# Patient Record
Sex: FEMALE | Race: WHITE | Employment: OTHER | URBAN - METROPOLITAN AREA
[De-identification: names, ages, dates, MRNs, and addresses within clinical notes are randomized per-mention and may not be internally consistent; named-entity substitution may affect disease eponyms.]

---

## 2017-07-21 PROCEDURE — 81001 URINALYSIS AUTO W/SCOPE: CPT | Performed by: INTERNAL MEDICINE

## 2017-11-01 ENCOUNTER — HOSPITAL ENCOUNTER (EMERGENCY)
Facility: HOSPITAL | Age: 77
Discharge: HOME | End: 2017-11-01
Attending: EMERGENCY MEDICINE
Payer: MEDICARE

## 2017-11-01 ENCOUNTER — APPOINTMENT (OUTPATIENT)
Dept: GENERAL RADIOLOGY | Facility: HOSPITAL | Age: 77
End: 2017-11-01
Payer: MEDICARE

## 2017-11-01 VITALS
RESPIRATION RATE: 16 BRPM | HEART RATE: 67 BPM | DIASTOLIC BLOOD PRESSURE: 58 MMHG | OXYGEN SATURATION: 99 % | SYSTOLIC BLOOD PRESSURE: 143 MMHG | WEIGHT: 230 LBS | TEMPERATURE: 98 F | BODY MASS INDEX: 39.27 KG/M2 | HEIGHT: 64 IN

## 2017-11-01 DIAGNOSIS — S83.91XA SPRAIN OF RIGHT KNEE, UNSPECIFIED LIGAMENT, INITIAL ENCOUNTER: Primary | ICD-10-CM

## 2017-11-01 PROCEDURE — 73562 X-RAY EXAM OF KNEE 3: CPT | Performed by: EMERGENCY MEDICINE

## 2017-11-01 PROCEDURE — 99284 EMERGENCY DEPT VISIT MOD MDM: CPT

## 2017-11-01 PROCEDURE — 99283 EMERGENCY DEPT VISIT LOW MDM: CPT

## 2017-11-01 NOTE — ED INITIAL ASSESSMENT (HPI)
Pt c/o right knee pain. Pt states she hyperextended her knee after slipping on black ice this past weekend. Pt was to see ortho tomorrow but is unable to bear weight. Pt states pain is worse today.

## 2017-11-01 NOTE — ED PROVIDER NOTES
Patient Seen in: [facility] Emergency Department    History   Patient presents with:  Lower Extremity Injury (musculoskeletal)    Stated Complaint: knee pain    HPI    26-year-old female presents to the emergency department with complaints of right Unspecified vitamin D deficiency 7/9/2010       Past Surgical History:  1995: BENIGN BIOPSY RIGHT  9/2003: COLONOSCOPY      Comment: normal  10/6/11 Lalo Check: COLONOSCOPY      Comment: adenoma, hemorrhoids.  repeat 2021.  11/17/16: Blanca Sharma 162.6 cm (5' 4\")   Wt 104.3 kg   SpO2 99%   BMI 39.48 kg/m²         Physical Exam   Constitutional: She is oriented to person, place, and time. She appears well-developed and well-nourished. HENT:   Head: Normocephalic and atraumatic.    Eyes: EOM are no [id number]  [phone]    Schedule an appointment as soon as possible for a visit in 1 day        Medications Prescribed:  Current Discharge Medication List

## 2017-11-02 PROBLEM — M17.11 PRIMARY OSTEOARTHRITIS OF RIGHT KNEE: Status: ACTIVE | Noted: 2017-11-02

## 2017-11-02 PROBLEM — S83.91XA SPRAIN OF RIGHT KNEE, UNSPECIFIED LIGAMENT, INITIAL ENCOUNTER: Status: ACTIVE | Noted: 2017-11-02

## 2018-03-07 PROBLEM — S83.241D: Status: ACTIVE | Noted: 2018-03-07

## 2018-04-25 PROBLEM — M71.22 BAKER'S CYST OF KNEE, LEFT: Status: ACTIVE | Noted: 2018-04-25

## 2018-04-25 PROBLEM — M17.12 OSTEOARTHRITIS OF LEFT KNEE, UNSPECIFIED OSTEOARTHRITIS TYPE: Status: ACTIVE | Noted: 2018-04-25

## 2018-05-04 ENCOUNTER — APPOINTMENT (OUTPATIENT)
Dept: LAB | Age: 78
End: 2018-05-04
Payer: MEDICARE

## 2018-05-04 DIAGNOSIS — M17.11 PRIMARY OSTEOARTHRITIS OF RIGHT KNEE: ICD-10-CM

## 2018-05-04 DIAGNOSIS — S83.241D ACUTE TEAR MEDIAL MENISCUS, RIGHT, SUBSEQUENT ENCOUNTER: ICD-10-CM

## 2018-05-04 PROCEDURE — 93005 ELECTROCARDIOGRAM TRACING: CPT

## 2018-05-04 PROCEDURE — 80048 BASIC METABOLIC PNL TOTAL CA: CPT

## 2018-05-04 PROCEDURE — 93010 ELECTROCARDIOGRAM REPORT: CPT | Performed by: INTERNAL MEDICINE

## 2018-05-04 PROCEDURE — 36415 COLL VENOUS BLD VENIPUNCTURE: CPT

## 2018-05-14 ENCOUNTER — ANESTHESIA EVENT (OUTPATIENT)
Dept: SURGERY | Facility: HOSPITAL | Age: 78
End: 2018-05-14

## 2018-05-14 NOTE — H&P
[facility]    PATIENT'S NAME: Carl Foster   ATTENDING PHYSICIAN: Luisa Covarrubias M.D.    PATIENT ACCOUNT#:   [de-identified]    LOCATION:  OR   [facility]  MEDICAL RECORD #:   [id number]       YOB: 1940  ADMISSION DATE:       05/15/2018    H chondroplasty as appropriate. Limitations, risks, complications, postoperative scenarios are stressed. Possibility of continued pain, stiffness, vascular compromise, DVT, infection, hemarthrosis, surgical failure is understood. She is well counseled.   Diane Hammer

## 2018-05-15 ENCOUNTER — HOSPITAL ENCOUNTER (OUTPATIENT)
Facility: HOSPITAL | Age: 78
Setting detail: HOSPITAL OUTPATIENT SURGERY
Discharge: HOME | End: 2018-05-15
Attending: ORTHOPAEDIC SURGERY | Admitting: ORTHOPAEDIC SURGERY
Payer: MEDICARE

## 2018-05-15 ENCOUNTER — SURGERY (OUTPATIENT)
Age: 78
End: 2018-05-15

## 2018-05-15 ENCOUNTER — ANESTHESIA (OUTPATIENT)
Dept: SURGERY | Facility: HOSPITAL | Age: 78
End: 2018-05-15

## 2018-05-15 VITALS
WEIGHT: 233.69 LBS | HEART RATE: 60 BPM | RESPIRATION RATE: 16 BRPM | OXYGEN SATURATION: 98 % | BODY MASS INDEX: 39.9 KG/M2 | SYSTOLIC BLOOD PRESSURE: 152 MMHG | DIASTOLIC BLOOD PRESSURE: 67 MMHG | TEMPERATURE: 99 F | HEIGHT: 64 IN

## 2018-05-15 DIAGNOSIS — M17.11 PRIMARY OSTEOARTHRITIS OF RIGHT KNEE: ICD-10-CM

## 2018-05-15 DIAGNOSIS — S83.241D ACUTE MEDIAL MENISCUS TEAR OF RIGHT KNEE, SUBSEQUENT ENCOUNTER: ICD-10-CM

## 2018-05-15 DIAGNOSIS — S83.241D ACUTE TEAR MEDIAL MENISCUS, RIGHT, SUBSEQUENT ENCOUNTER: Primary | ICD-10-CM

## 2018-05-15 PROCEDURE — 0SBC4ZZ EXCISION OF RIGHT KNEE JOINT, PERCUTANEOUS ENDOSCOPIC APPROACH: ICD-10-PCS | Performed by: ORTHOPAEDIC SURGERY

## 2018-05-15 RX ORDER — MEPERIDINE HYDROCHLORIDE 25 MG/ML
12.5 INJECTION INTRAMUSCULAR; INTRAVENOUS; SUBCUTANEOUS AS NEEDED
Status: DISCONTINUED | OUTPATIENT
Start: 2018-05-15 | End: 2018-05-15

## 2018-05-15 RX ORDER — HYDROMORPHONE HYDROCHLORIDE 1 MG/ML
0.4 INJECTION, SOLUTION INTRAMUSCULAR; INTRAVENOUS; SUBCUTANEOUS EVERY 5 MIN PRN
Status: DISCONTINUED | OUTPATIENT
Start: 2018-05-15 | End: 2018-05-15

## 2018-05-15 RX ORDER — ACETAMINOPHEN 500 MG
1000 TABLET ORAL ONCE
COMMUNITY
End: 2018-05-24

## 2018-05-15 RX ORDER — SODIUM CHLORIDE, SODIUM LACTATE, POTASSIUM CHLORIDE, CALCIUM CHLORIDE 600; 310; 30; 20 MG/100ML; MG/100ML; MG/100ML; MG/100ML
INJECTION, SOLUTION INTRAVENOUS CONTINUOUS
Status: DISCONTINUED | OUTPATIENT
Start: 2018-05-15 | End: 2018-05-15

## 2018-05-15 RX ORDER — NALOXONE HYDROCHLORIDE 0.4 MG/ML
80 INJECTION, SOLUTION INTRAMUSCULAR; INTRAVENOUS; SUBCUTANEOUS AS NEEDED
Status: DISCONTINUED | OUTPATIENT
Start: 2018-05-15 | End: 2018-05-15

## 2018-05-15 RX ORDER — BUPIVACAINE HYDROCHLORIDE AND EPINEPHRINE 5; 5 MG/ML; UG/ML
INJECTION, SOLUTION EPIDURAL; INTRACAUDAL; PERINEURAL AS NEEDED
Status: DISCONTINUED | OUTPATIENT
Start: 2018-05-15 | End: 2018-05-15 | Stop reason: HOSPADM

## 2018-05-15 RX ORDER — HYDROCODONE BITARTRATE AND ACETAMINOPHEN 5; 325 MG/1; MG/1
2 TABLET ORAL AS NEEDED
Status: COMPLETED | OUTPATIENT
Start: 2018-05-15 | End: 2018-05-15

## 2018-05-15 RX ORDER — MIDAZOLAM HYDROCHLORIDE 1 MG/ML
1 INJECTION INTRAMUSCULAR; INTRAVENOUS EVERY 5 MIN PRN
Status: DISCONTINUED | OUTPATIENT
Start: 2018-05-15 | End: 2018-05-15

## 2018-05-15 RX ORDER — ONDANSETRON 2 MG/ML
4 INJECTION INTRAMUSCULAR; INTRAVENOUS AS NEEDED
Status: DISCONTINUED | OUTPATIENT
Start: 2018-05-15 | End: 2018-05-15

## 2018-05-15 RX ORDER — HYDROCODONE BITARTRATE AND ACETAMINOPHEN 5; 325 MG/1; MG/1
1 TABLET ORAL AS NEEDED
Status: COMPLETED | OUTPATIENT
Start: 2018-05-15 | End: 2018-05-15

## 2018-05-15 RX ORDER — CLINDAMYCIN PHOSPHATE 900 MG/50ML
900 INJECTION INTRAVENOUS ONCE
Status: COMPLETED | OUTPATIENT
Start: 2018-05-15 | End: 2018-05-15

## 2018-05-15 RX ORDER — DIPHENHYDRAMINE HYDROCHLORIDE 50 MG/ML
12.5 INJECTION INTRAMUSCULAR; INTRAVENOUS AS NEEDED
Status: DISCONTINUED | OUTPATIENT
Start: 2018-05-15 | End: 2018-05-15

## 2018-05-15 RX ORDER — LABETALOL HYDROCHLORIDE 5 MG/ML
5 INJECTION, SOLUTION INTRAVENOUS EVERY 5 MIN PRN
Status: DISCONTINUED | OUTPATIENT
Start: 2018-05-15 | End: 2018-05-15

## 2018-05-15 NOTE — BRIEF OP NOTE
Pre-Operative Diagnosis: Primary osteoarthritis of right knee [M17.11]  Acute medial meniscus tear of right knee, subsequent encounter [Z43.000U]     Post-Operative Diagnosis: Primary osteoarthritis of right knee [M17.11]Acute medial meniscus tear of right

## 2018-05-15 NOTE — INTERVAL H&P NOTE
Pre-op Diagnosis: Primary osteoarthritis of right knee [M17.11]  Acute medial meniscus tear of right knee, subsequent encounter [Y03.922F]    The above referenced H&P was reviewed by Audleia De MD on 5/15/2018, the patient was examined and no signifi

## 2018-05-15 NOTE — ANESTHESIA PREPROCEDURE EVALUATION
PRE-OP EVALUATION    Patient Name: Josh Brizuela    Pre-op Diagnosis: Primary osteoarthritis of right knee [M17.11]  Acute medial meniscus tear of right knee, subsequent encounter [D93.580X]    Procedure(s):  ARTHROSCOPY WITH PARTIAL MEDIAL MENISCECTOMY SVT    (-) angina     (-) HINOJOSA         Endo/Other      (-) diabetes     (+) hypothyroidism                (+) arthritis       Pulmonary      (-) asthma  (-) COPD            (-) sleep apnea       Neuro/Psych          (-) CVA     (-) seizures spouse                Present on Admission:  **None**

## 2018-05-15 NOTE — ANESTHESIA POSTPROCEDURE EVALUATION
[address] [place] Patient Status:  Hospital Outpatient Surgery   Age/Gender 70year old female MRN [id number]   [facility] SURGERY Attending Evelina Katz MD   Hosp Day # 0 PCP Zan Salazar MD       Anesthesia Post-op

## 2018-05-16 PROBLEM — Z47.89 ORTHOPEDIC AFTERCARE: Status: ACTIVE | Noted: 2018-05-16

## 2018-05-16 NOTE — OPERATIVE REPORT
[facility]    PATIENT'S NAME: Josette Jones   ATTENDING PHYSICIAN: Damaris Corona M.D. OPERATING PHYSICIAN: Damaris Corona M.D.    PATIENT ACCOUNT#:   [de-identified]    LOCATION:  [address] [place] [address] 14 EDWP 10  MEDICAL RECORD #:   [id number] Antibiotics are given. The extremity is initialed by me in preop holding. The usual superomedial arthroscopic inflow portal is established. Inferolateral viewing portal is established. Suprapatellar pouch is seen, grade 3 change is noted.   Grade 2 vega

## 2018-05-23 ENCOUNTER — HOSPITAL ENCOUNTER (EMERGENCY)
Facility: HOSPITAL | Age: 78
Discharge: HOME | End: 2018-05-24
Attending: EMERGENCY MEDICINE
Payer: MEDICARE

## 2018-05-23 ENCOUNTER — APPOINTMENT (OUTPATIENT)
Dept: GENERAL RADIOLOGY | Facility: HOSPITAL | Age: 78
End: 2018-05-23
Attending: EMERGENCY MEDICINE
Payer: MEDICARE

## 2018-05-23 ENCOUNTER — APPOINTMENT (OUTPATIENT)
Dept: ULTRASOUND IMAGING | Facility: HOSPITAL | Age: 78
End: 2018-05-23
Attending: EMERGENCY MEDICINE
Payer: MEDICARE

## 2018-05-23 DIAGNOSIS — I48.92 ATRIAL FLUTTER, PAROXYSMAL (HCC): Primary | ICD-10-CM

## 2018-05-23 PROCEDURE — 99291 CRITICAL CARE FIRST HOUR: CPT

## 2018-05-23 PROCEDURE — 96365 THER/PROPH/DIAG IV INF INIT: CPT

## 2018-05-23 PROCEDURE — 71045 X-RAY EXAM CHEST 1 VIEW: CPT | Performed by: EMERGENCY MEDICINE

## 2018-05-23 PROCEDURE — 85610 PROTHROMBIN TIME: CPT | Performed by: EMERGENCY MEDICINE

## 2018-05-23 PROCEDURE — 84484 ASSAY OF TROPONIN QUANT: CPT | Performed by: EMERGENCY MEDICINE

## 2018-05-23 PROCEDURE — 93971 EXTREMITY STUDY: CPT | Performed by: EMERGENCY MEDICINE

## 2018-05-23 PROCEDURE — 83735 ASSAY OF MAGNESIUM: CPT | Performed by: EMERGENCY MEDICINE

## 2018-05-23 PROCEDURE — 85025 COMPLETE CBC W/AUTO DIFF WBC: CPT | Performed by: EMERGENCY MEDICINE

## 2018-05-23 PROCEDURE — 80053 COMPREHEN METABOLIC PANEL: CPT | Performed by: EMERGENCY MEDICINE

## 2018-05-23 PROCEDURE — 83880 ASSAY OF NATRIURETIC PEPTIDE: CPT | Performed by: EMERGENCY MEDICINE

## 2018-05-23 PROCEDURE — 96366 THER/PROPH/DIAG IV INF ADDON: CPT

## 2018-05-23 PROCEDURE — 93010 ELECTROCARDIOGRAM REPORT: CPT

## 2018-05-23 PROCEDURE — 93005 ELECTROCARDIOGRAM TRACING: CPT

## 2018-05-23 PROCEDURE — 99285 EMERGENCY DEPT VISIT HI MDM: CPT

## 2018-05-23 PROCEDURE — 84443 ASSAY THYROID STIM HORMONE: CPT | Performed by: EMERGENCY MEDICINE

## 2018-05-23 PROCEDURE — 85730 THROMBOPLASTIN TIME PARTIAL: CPT | Performed by: EMERGENCY MEDICINE

## 2018-05-23 RX ORDER — HEPARIN SODIUM AND DEXTROSE 10000; 5 [USP'U]/100ML; G/100ML
INJECTION INTRAVENOUS CONTINUOUS
Status: CANCELLED | OUTPATIENT
Start: 2018-05-24

## 2018-05-23 RX ORDER — HEPARIN SODIUM 5000 [USP'U]/ML
5000 INJECTION INTRAVENOUS; SUBCUTANEOUS ONCE
Status: DISCONTINUED | OUTPATIENT
Start: 2018-05-23 | End: 2018-05-23

## 2018-05-23 RX ORDER — HEPARIN SODIUM AND DEXTROSE 10000; 5 [USP'U]/100ML; G/100ML
1000 INJECTION INTRAVENOUS ONCE
Status: DISCONTINUED | OUTPATIENT
Start: 2018-05-23 | End: 2018-05-23

## 2018-05-24 ENCOUNTER — APPOINTMENT (OUTPATIENT)
Dept: CT IMAGING | Facility: HOSPITAL | Age: 78
End: 2018-05-24
Attending: EMERGENCY MEDICINE
Payer: MEDICARE

## 2018-05-24 VITALS
HEIGHT: 64 IN | SYSTOLIC BLOOD PRESSURE: 137 MMHG | HEART RATE: 66 BPM | TEMPERATURE: 96 F | DIASTOLIC BLOOD PRESSURE: 57 MMHG | RESPIRATION RATE: 15 BRPM | BODY MASS INDEX: 38.41 KG/M2 | OXYGEN SATURATION: 98 % | WEIGHT: 225 LBS

## 2018-05-24 PROCEDURE — 71275 CT ANGIOGRAPHY CHEST: CPT | Performed by: EMERGENCY MEDICINE

## 2018-05-24 PROCEDURE — 93005 ELECTROCARDIOGRAM TRACING: CPT

## 2018-05-24 RX ORDER — METOPROLOL TARTRATE 75 MG/1
75 TABLET, FILM COATED ORAL 2 TIMES DAILY
Qty: 60 TABLET | Refills: 0 | Status: SHIPPED | OUTPATIENT
Start: 2018-05-24 | End: 2018-05-24

## 2018-05-24 NOTE — ED PROVIDER NOTES
Patient Seen in: [facility] Emergency Department    History   Patient presents with:  Arrythmia/Palpitations (cardiovascular)    Stated Complaint: Racing heart rate    HPI    70-year-old female presents emergency room for evaluation of racing heart. 6/17/2009   • Unspecified vitamin D deficiency 7/9/2010   • Visual impairment     glasses       Past Surgical History:  1995: BENIGN BIOPSY RIGHT  9/2003: COLONOSCOPY      Comment: normal  10/6/11 Opal Ast: COLONOSCOPY      Comment: adenoma, hemorrhoids.  r auscultation bilaterally. No Rales, no rhonchi, no wheezing, no stridor. ABDOMEN: Soft, nondistended,non tender, bowel sounds are present, no rebound, no rigidity, no guarding. no pulsatile masses.  No CVA tenderness  EXTREMITIES: No tenderness or swelling noted on EKG Report. Rate: 72  Rhythm: Sinus Rhythm  Reading: Sinus rhythm with first-degree AV block.   No acute ST or T-wave abnormality          ED Course as of May 24 0155  ------------------------------------------------------------    Preliminary Rad patient's cardiovascular instability due to her atrial flutter with rapid ventricular response. This involved direct patient intervention, complex decision making, and/or extensive discussions with the patient, family, and clinical staff.       LATONIA Nova

## 2018-07-30 PROBLEM — Z47.89 ORTHOPEDIC AFTERCARE: Status: RESOLVED | Noted: 2018-05-16 | Resolved: 2018-07-30

## 2018-08-02 PROCEDURE — 81001 URINALYSIS AUTO W/SCOPE: CPT | Performed by: INTERNAL MEDICINE

## 2018-09-26 PROBLEM — M17.12 PRIMARY OSTEOARTHRITIS OF LEFT KNEE: Status: ACTIVE | Noted: 2018-09-26

## 2018-11-08 PROCEDURE — 82397 CHEMILUMINESCENT ASSAY: CPT | Performed by: INTERNAL MEDICINE

## 2018-11-08 PROCEDURE — 86141 C-REACTIVE PROTEIN HS: CPT | Performed by: INTERNAL MEDICINE

## 2019-02-19 ENCOUNTER — HOSPITAL ENCOUNTER (EMERGENCY)
Age: 79
Discharge: HOME | End: 2019-02-19
Attending: EMERGENCY MEDICINE
Payer: MEDICARE

## 2019-02-19 ENCOUNTER — APPOINTMENT (OUTPATIENT)
Dept: GENERAL RADIOLOGY | Age: 79
End: 2019-02-19
Attending: EMERGENCY MEDICINE
Payer: MEDICARE

## 2019-02-19 ENCOUNTER — APPOINTMENT (OUTPATIENT)
Dept: CT IMAGING | Age: 79
End: 2019-02-19
Attending: EMERGENCY MEDICINE
Payer: MEDICARE

## 2019-02-19 VITALS
BODY MASS INDEX: 38 KG/M2 | SYSTOLIC BLOOD PRESSURE: 150 MMHG | HEART RATE: 71 BPM | WEIGHT: 220.44 LBS | TEMPERATURE: 98 F | DIASTOLIC BLOOD PRESSURE: 65 MMHG | RESPIRATION RATE: 16 BRPM | OXYGEN SATURATION: 98 %

## 2019-02-19 DIAGNOSIS — S09.90XA CLOSED HEAD INJURY, INITIAL ENCOUNTER: ICD-10-CM
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
## 2019-02-19 DIAGNOSIS — S20.229A CONTUSION OF BACK, UNSPECIFIED LATERALITY, INITIAL ENCOUNTER: ICD-10-CM

## 2019-02-19 PROCEDURE — 70450 CT HEAD/BRAIN W/O DYE: CPT | Performed by: EMERGENCY MEDICINE

## 2019-02-19 PROCEDURE — 99284 EMERGENCY DEPT VISIT MOD MDM: CPT

## 2019-02-19 PROCEDURE — 72110 X-RAY EXAM L-2 SPINE 4/>VWS: CPT | Performed by: EMERGENCY MEDICINE

## 2019-02-19 RX ORDER — ACETAMINOPHEN 500 MG
1000 TABLET ORAL ONCE
Status: COMPLETED | OUTPATIENT
Start: 2019-02-19 | End: 2019-02-19

## 2019-02-20 NOTE — ED PROVIDER NOTES
Patient Seen in: [facility] Emergency Department In [place]    History   Patient presents with:  Head Neck Injury (neurologic, musculoskeletal)  Fall (musculoskeletal, neurologic)    Stated Complaint: fall on ice, hit head, no LOC.  headache and low back p MAIN OR   • REPAIR ROTATOR CUFF,ACUTE Right 6/2014   • SHOULDER ARTHROSCOPY ROTATOR CUFF REPAIR Right 6/10/2014    Performed by Octavia Gonzales MD at [place] [address]      removed in childhood around age 10   • UPPER GI ENDOSCOPY - REFERRAL Awake, conversive and moving all 4 extremities well. ED Course   Labs Reviewed - No data to display       The patient declined analgesics for pain.     Xr Lumbar Spine (min 4 Views) (cpt=72110)    Result Date: 2/19/2019  CONCLUSION:  Marked facet degener

## 2019-03-07 PROBLEM — M76.52 PATELLAR TENDONITIS OF LEFT KNEE: Status: ACTIVE | Noted: 2019-03-07

## 2019-04-17 PROBLEM — M65.321 ACQUIRED TRIGGER FINGER OF RIGHT INDEX FINGER: Status: ACTIVE | Noted: 2019-04-17

## 2019-08-01 PROCEDURE — 81001 URINALYSIS AUTO W/SCOPE: CPT | Performed by: INTERNAL MEDICINE

## 2019-09-26 PROBLEM — M17.11 PRIMARY OSTEOARTHRITIS OF RIGHT KNEE: Status: ACTIVE | Noted: 2018-03-07

## 2019-09-26 PROBLEM — M17.11 PRIMARY OSTEOARTHRITIS OF RIGHT KNEE: Status: RESOLVED | Noted: 2017-11-02 | Resolved: 2019-09-26

## 2019-09-29 ENCOUNTER — APPOINTMENT (OUTPATIENT)
Dept: CT IMAGING | Facility: HOSPITAL | Age: 79
End: 2019-09-29
Attending: EMERGENCY MEDICINE
Payer: MEDICARE

## 2019-09-29 ENCOUNTER — HOSPITAL ENCOUNTER (EMERGENCY)
Facility: HOSPITAL | Age: 79
Discharge: HOME | End: 2019-09-29
Attending: EMERGENCY MEDICINE
Payer: MEDICARE

## 2019-09-29 VITALS
DIASTOLIC BLOOD PRESSURE: 63 MMHG | HEART RATE: 58 BPM | OXYGEN SATURATION: 98 % | HEIGHT: 64 IN | SYSTOLIC BLOOD PRESSURE: 145 MMHG | TEMPERATURE: 98 F | RESPIRATION RATE: 14 BRPM | BODY MASS INDEX: 37.22 KG/M2 | WEIGHT: 218 LBS

## 2019-09-29 DIAGNOSIS — K29.00 ACUTE GASTRITIS WITHOUT HEMORRHAGE, UNSPECIFIED GASTRITIS TYPE: Primary | ICD-10-CM

## 2019-09-29 PROCEDURE — 74177 CT ABD & PELVIS W/CONTRAST: CPT | Performed by: EMERGENCY MEDICINE

## 2019-09-29 PROCEDURE — 93005 ELECTROCARDIOGRAM TRACING: CPT

## 2019-09-29 PROCEDURE — 84484 ASSAY OF TROPONIN QUANT: CPT | Performed by: EMERGENCY MEDICINE

## 2019-09-29 PROCEDURE — 96374 THER/PROPH/DIAG INJ IV PUSH: CPT

## 2019-09-29 PROCEDURE — 81001 URINALYSIS AUTO W/SCOPE: CPT | Performed by: EMERGENCY MEDICINE

## 2019-09-29 PROCEDURE — 99285 EMERGENCY DEPT VISIT HI MDM: CPT

## 2019-09-29 PROCEDURE — 80053 COMPREHEN METABOLIC PANEL: CPT | Performed by: EMERGENCY MEDICINE

## 2019-09-29 PROCEDURE — 85025 COMPLETE CBC W/AUTO DIFF WBC: CPT | Performed by: EMERGENCY MEDICINE

## 2019-09-29 PROCEDURE — 96361 HYDRATE IV INFUSION ADD-ON: CPT

## 2019-09-29 PROCEDURE — 93010 ELECTROCARDIOGRAM REPORT: CPT

## 2019-09-29 RX ORDER — MAGNESIUM HYDROXIDE/ALUMINUM HYDROXICE/SIMETHICONE 120; 1200; 1200 MG/30ML; MG/30ML; MG/30ML
30 SUSPENSION ORAL ONCE
Status: COMPLETED | OUTPATIENT
Start: 2019-09-29 | End: 2019-09-29

## 2019-09-29 RX ORDER — ONDANSETRON 2 MG/ML
4 INJECTION INTRAMUSCULAR; INTRAVENOUS ONCE
Status: COMPLETED | OUTPATIENT
Start: 2019-09-29 | End: 2019-09-29

## 2019-09-29 RX ORDER — LIDOCAINE HYDROCHLORIDE 20 MG/ML
10 SOLUTION OROPHARYNGEAL ONCE
Status: COMPLETED | OUTPATIENT
Start: 2019-09-29 | End: 2019-09-29

## 2019-09-29 RX ORDER — OMEPRAZOLE 40 MG/1
40 CAPSULE, DELAYED RELEASE ORAL DAILY
Qty: 30 CAPSULE | Refills: 0 | Status: SHIPPED | OUTPATIENT
Start: 2019-09-29 | End: 2019-10-24

## 2019-09-29 NOTE — ED INITIAL ASSESSMENT (HPI)
Pt aox4. Pt presents to ed from Immediate care. Pt c/o epigastric fullness/pressure sine Friday. Pt states s/s are worse when lying down.

## 2019-09-29 NOTE — ED PROVIDER NOTES
Patient Seen in: [facility] Emergency Department      History   Patient presents with:  Abdomen/Flank Pain (GI/)    Stated Complaint: abd pain    HPI    71-year-old female presents emergency department who was sent from immediate care.   Patient ap normal   • HERNIA SURGERY  1985    inguinal   • HYSTERECTOMY  1985    with BSO   • KNEE ARTHROSCOPY Right 5/15/2018    Performed by Merlinda Lake, MD at [facility] Right 6/2014   • SHOULDER ARTHROSCOPY ROTATOR CUFF REPAI No clubbing cyanosis or edema 2+ distal pulses. Neuro: Cranial nerves II through XII intact with no gross focal sensory or motor abnormality.       ED Course     Labs Reviewed   COMP METABOLIC PANEL (14) - Abnormal; Notable for the following components: recommended. 2. There are mildly enlarged lymph nodes inferior to the gastric wall thickening which may be reactive or represent metastatic lymph nodes. There is also possible wall thickening of the gastric fundus.   Clinical correlation with direct inspec

## 2019-10-03 PROCEDURE — 88305 TISSUE EXAM BY PATHOLOGIST: CPT | Performed by: INTERNAL MEDICINE

## 2019-11-26 PROCEDURE — 88305 TISSUE EXAM BY PATHOLOGIST: CPT | Performed by: INTERNAL MEDICINE

## 2020-02-10 ENCOUNTER — HOSPITAL ENCOUNTER (OUTPATIENT)
Dept: PHYSICAL THERAPY | Facility: HOSPITAL | Age: 80
Discharge: HOME | End: 2020-02-10
Attending: ORTHOPAEDIC SURGERY
Payer: MEDICARE

## 2020-02-10 ENCOUNTER — LABORATORY ENCOUNTER (OUTPATIENT)
Dept: LAB | Facility: HOSPITAL | Age: 80
End: 2020-02-10
Attending: ORTHOPAEDIC SURGERY
Payer: MEDICARE

## 2020-02-10 DIAGNOSIS — M17.12 OSTEOARTHRITIS OF LEFT KNEE, UNSPECIFIED OSTEOARTHRITIS TYPE: ICD-10-CM

## 2020-02-10 LAB
ANTIBODY SCREEN: NEGATIVE
RH BLOOD TYPE: POSITIVE

## 2020-02-10 PROCEDURE — 86901 BLOOD TYPING SEROLOGIC RH(D): CPT

## 2020-02-10 PROCEDURE — 86850 RBC ANTIBODY SCREEN: CPT

## 2020-02-10 PROCEDURE — 86900 BLOOD TYPING SEROLOGIC ABO: CPT

## 2020-02-10 PROCEDURE — 87081 CULTURE SCREEN ONLY: CPT

## 2020-02-11 ENCOUNTER — ANESTHESIA EVENT (OUTPATIENT)
Dept: SURGERY | Facility: HOSPITAL | Age: 80
End: 2020-02-11
Payer: MEDICARE

## 2020-02-24 NOTE — ANESTHESIA PREPROCEDURE EVALUATION
PRE-OP EVALUATION    Patient Name: Ivan Pace    Pre-op Diagnosis: Primary osteoarthritis of left knee [M17.12]    Procedure(s):  LEFT TOTAL KNEE ARTHROPLASTY    Surgeon(s) and Role:     * Guerda Velazquez MD - Primary    Pre-op vitals reviewed. ROS.                       Neuro/Psych    Negative neuro/psych ROS.                                 Past Surgical History:   Procedure Laterality Date   • ARTHROSCOPY OF JOINT UNLISTED Right     knee   • BENIGN BIOPSY RIGHT  1995   • COLONOSCOPY  9/2003 02/06/2020    CREATSERUM 1.03 (H) 02/06/2020    GLU 90 02/06/2020     Lab Results   Component Value Date    INR 1.0 02/06/2020         Airway      Mallampati: III  Mouth opening: >3 FB  TM distance: 4 - 6 cm  Neck ROM: full Cardiovascular    Cardiovascular

## 2020-02-24 NOTE — H&P
[facility]  Orthopedic Surgery  HISTORY AND PHYSICAL EXAMINATION    Patient: Peter Burnett  Medical Record Number: [id number]    CHIEF COMPLAINT: Left knee pain  HPI:   Rogerio Kendall is a 68year old female  Followed in the office.   Failed exhaustive • Esophageal reflux 2/26/2008   • Hearing impairment     Roman HA's   • High blood pressure    • Hypothyroidism    • Mixed hyperlipidemia 2/26/2008   • OBESITY    • Osteoarthrosis, unspecified whether generalized or localized, unspecified site    • TINNIT bilaterally, no rales or wheezes  ABDOMINAL: Soft, no palpable masses. NEURO:  Alert and orientated X3, cranial nerves II-XII intact. EXTREMITIES: Well-developed well-nourished 45-year-old female, abnormal gait and station. Ambulates with a limp.   Right

## 2020-02-25 ENCOUNTER — APPOINTMENT (OUTPATIENT)
Dept: GENERAL RADIOLOGY | Facility: HOSPITAL | Age: 80
End: 2020-02-25
Attending: ORTHOPAEDIC SURGERY
Payer: MEDICARE

## 2020-02-25 ENCOUNTER — HOSPITAL ENCOUNTER (OUTPATIENT)
Facility: HOSPITAL | Age: 80
Discharge: HOME HEALTH | End: 2020-02-26
Attending: ORTHOPAEDIC SURGERY | Admitting: ORTHOPAEDIC SURGERY
Payer: MEDICARE

## 2020-02-25 ENCOUNTER — ANESTHESIA (OUTPATIENT)
Dept: SURGERY | Facility: HOSPITAL | Age: 80
End: 2020-02-25
Payer: MEDICARE

## 2020-02-25 DIAGNOSIS — M17.12 PRIMARY OSTEOARTHRITIS OF LEFT KNEE: ICD-10-CM

## 2020-02-25 DIAGNOSIS — M17.12 OSTEOARTHRITIS OF LEFT KNEE, UNSPECIFIED OSTEOARTHRITIS TYPE: Primary | ICD-10-CM

## 2020-02-25 PROBLEM — M71.22 BAKER'S CYST OF KNEE, LEFT: Status: RESOLVED | Noted: 2018-04-25 | Resolved: 2020-02-25

## 2020-02-25 PROBLEM — M76.52 PATELLAR TENDONITIS OF LEFT KNEE: Status: RESOLVED | Noted: 2019-03-07 | Resolved: 2020-02-25

## 2020-02-25 LAB
ANION GAP SERPL CALC-SCNC: 8 MMOL/L (ref 0–18)
BUN BLD-MCNC: 23 MG/DL (ref 7–18)
BUN/CREAT SERPL: 20.7 (ref 10–20)
CALCIUM BLD-MCNC: 9.3 MG/DL (ref 8.5–10.1)
CHLORIDE SERPL-SCNC: 108 MMOL/L (ref 98–112)
CO2 SERPL-SCNC: 21 MMOL/L (ref 21–32)
CREAT BLD-MCNC: 1.07 MG/DL (ref 0.55–1.02)
CREAT BLD-MCNC: 1.11 MG/DL (ref 0.55–1.02)
GLUCOSE BLD-MCNC: 106 MG/DL (ref 70–99)
OSMOLALITY SERPL CALC.SUM OF ELEC: 288 MOSM/KG (ref 275–295)
POTASSIUM SERPL-SCNC: 4 MMOL/L (ref 3.5–5.1)
SODIUM SERPL-SCNC: 137 MMOL/L (ref 136–145)

## 2020-02-25 PROCEDURE — 88311 DECALCIFY TISSUE: CPT | Performed by: ORTHOPAEDIC SURGERY

## 2020-02-25 PROCEDURE — 76942 ECHO GUIDE FOR BIOPSY: CPT

## 2020-02-25 PROCEDURE — 73560 X-RAY EXAM OF KNEE 1 OR 2: CPT | Performed by: ORTHOPAEDIC SURGERY

## 2020-02-25 PROCEDURE — 97530 THERAPEUTIC ACTIVITIES: CPT

## 2020-02-25 PROCEDURE — 88305 TISSUE EXAM BY PATHOLOGIST: CPT | Performed by: ORTHOPAEDIC SURGERY

## 2020-02-25 PROCEDURE — 97161 PT EVAL LOW COMPLEX 20 MIN: CPT

## 2020-02-25 PROCEDURE — 0SRD0J9 REPLACEMENT OF LEFT KNEE JOINT WITH SYNTHETIC SUBSTITUTE, CEMENTED, OPEN APPROACH: ICD-10-PCS | Performed by: ORTHOPAEDIC SURGERY

## 2020-02-25 PROCEDURE — 96375 TX/PRO/DX INJ NEW DRUG ADDON: CPT

## 2020-02-25 PROCEDURE — 3E0T3BZ INTRODUCTION OF ANESTHETIC AGENT INTO PERIPHERAL NERVES AND PLEXI, PERCUTANEOUS APPROACH: ICD-10-PCS | Performed by: ANESTHESIOLOGY

## 2020-02-25 PROCEDURE — 80048 BASIC METABOLIC PNL TOTAL CA: CPT | Performed by: HOSPITALIST

## 2020-02-25 PROCEDURE — 82565 ASSAY OF CREATININE: CPT | Performed by: ORTHOPAEDIC SURGERY

## 2020-02-25 DEVICE — REFOBACIN BC R 1X40 US: Type: IMPLANTABLE DEVICE | Site: KNEE | Status: FUNCTIONAL

## 2020-02-25 DEVICE — PSN FEM CR CMT CCR NRW SZ9 L: Type: IMPLANTABLE DEVICE | Site: KNEE | Status: FUNCTIONAL

## 2020-02-25 DEVICE — PSN ALL POLY PAT PLY 35MM: Type: IMPLANTABLE DEVICE | Site: KNEE | Status: FUNCTIONAL

## 2020-02-25 DEVICE — PSN TIB STM 5 DEG SZ E L: Type: IMPLANTABLE DEVICE | Site: KNEE | Status: FUNCTIONAL

## 2020-02-25 DEVICE — PSN STR HYB ST 14X+30 M: Type: IMPLANTABLE DEVICE | Site: KNEE | Status: FUNCTIONAL

## 2020-02-25 RX ORDER — CEFAZOLIN SODIUM/WATER 2 G/20 ML
2 SYRINGE (ML) INTRAVENOUS EVERY 8 HOURS
Status: COMPLETED | OUTPATIENT
Start: 2020-02-25 | End: 2020-02-25

## 2020-02-25 RX ORDER — HYDROCODONE BITARTRATE AND ACETAMINOPHEN 5; 325 MG/1; MG/1
2 TABLET ORAL AS NEEDED
Status: DISCONTINUED | OUTPATIENT
Start: 2020-02-25 | End: 2020-02-25 | Stop reason: HOSPADM

## 2020-02-25 RX ORDER — POLYETHYLENE GLYCOL 3350 17 G/17G
17 POWDER, FOR SOLUTION ORAL DAILY PRN
Status: DISCONTINUED | OUTPATIENT
Start: 2020-02-25 | End: 2020-02-26

## 2020-02-25 RX ORDER — ONDANSETRON 2 MG/ML
INJECTION INTRAMUSCULAR; INTRAVENOUS AS NEEDED
Status: DISCONTINUED | OUTPATIENT
Start: 2020-02-25 | End: 2020-02-25 | Stop reason: SURG

## 2020-02-25 RX ORDER — HYDROMORPHONE HYDROCHLORIDE 1 MG/ML
0.4 INJECTION, SOLUTION INTRAMUSCULAR; INTRAVENOUS; SUBCUTANEOUS EVERY 5 MIN PRN
Status: DISCONTINUED | OUTPATIENT
Start: 2020-02-25 | End: 2020-02-25 | Stop reason: HOSPADM

## 2020-02-25 RX ORDER — OXYCODONE HYDROCHLORIDE 10 MG/1
10 TABLET ORAL EVERY 4 HOURS PRN
Status: DISCONTINUED | OUTPATIENT
Start: 2020-02-25 | End: 2020-02-26

## 2020-02-25 RX ORDER — HYDROCODONE BITARTRATE AND ACETAMINOPHEN 5; 325 MG/1; MG/1
1 TABLET ORAL AS NEEDED
Status: DISCONTINUED | OUTPATIENT
Start: 2020-02-25 | End: 2020-02-25 | Stop reason: HOSPADM

## 2020-02-25 RX ORDER — OXYCODONE HYDROCHLORIDE 15 MG/1
15 TABLET ORAL EVERY 4 HOURS PRN
Status: DISCONTINUED | OUTPATIENT
Start: 2020-02-25 | End: 2020-02-26

## 2020-02-25 RX ORDER — SODIUM CHLORIDE 9 MG/ML
INJECTION, SOLUTION INTRAVENOUS CONTINUOUS
Status: DISCONTINUED | OUTPATIENT
Start: 2020-02-25 | End: 2020-02-26

## 2020-02-25 RX ORDER — DIPHENHYDRAMINE HCL 25 MG
25 CAPSULE ORAL EVERY 4 HOURS PRN
Status: DISCONTINUED | OUTPATIENT
Start: 2020-02-25 | End: 2020-02-26

## 2020-02-25 RX ORDER — NALOXONE HYDROCHLORIDE 0.4 MG/ML
80 INJECTION, SOLUTION INTRAMUSCULAR; INTRAVENOUS; SUBCUTANEOUS AS NEEDED
Status: DISCONTINUED | OUTPATIENT
Start: 2020-02-25 | End: 2020-02-25 | Stop reason: HOSPADM

## 2020-02-25 RX ORDER — OXYCODONE HYDROCHLORIDE 5 MG/1
5 TABLET ORAL EVERY 4 HOURS PRN
Status: DISCONTINUED | OUTPATIENT
Start: 2020-02-25 | End: 2020-02-26

## 2020-02-25 RX ORDER — ROPIVACAINE HYDROCHLORIDE 5 MG/ML
INJECTION, SOLUTION EPIDURAL; INFILTRATION; PERINEURAL AS NEEDED
Status: DISCONTINUED | OUTPATIENT
Start: 2020-02-25 | End: 2020-02-25 | Stop reason: SURG

## 2020-02-25 RX ORDER — DIPHENHYDRAMINE HYDROCHLORIDE 50 MG/ML
12.5 INJECTION INTRAMUSCULAR; INTRAVENOUS EVERY 4 HOURS PRN
Status: DISCONTINUED | OUTPATIENT
Start: 2020-02-25 | End: 2020-02-26

## 2020-02-25 RX ORDER — LEVOTHYROXINE SODIUM 0.1 MG/1
TABLET ORAL SEE ADMIN INSTRUCTIONS
COMMUNITY
End: 2020-06-07

## 2020-02-25 RX ORDER — TRAMADOL HYDROCHLORIDE 50 MG/1
50 TABLET ORAL EVERY 6 HOURS
Status: DISCONTINUED | OUTPATIENT
Start: 2020-02-25 | End: 2020-02-26

## 2020-02-25 RX ORDER — ACETAMINOPHEN 325 MG/1
650 TABLET ORAL 4 TIMES DAILY
Status: DISCONTINUED | OUTPATIENT
Start: 2020-02-25 | End: 2020-02-26

## 2020-02-25 RX ORDER — CEFAZOLIN SODIUM/WATER 2 G/20 ML
SYRINGE (ML) INTRAVENOUS
Status: DISPENSED
Start: 2020-02-25 | End: 2020-02-25

## 2020-02-25 RX ORDER — KETOROLAC TROMETHAMINE 15 MG/ML
15 INJECTION, SOLUTION INTRAMUSCULAR; INTRAVENOUS EVERY 6 HOURS
Status: COMPLETED | OUTPATIENT
Start: 2020-02-25 | End: 2020-02-26

## 2020-02-25 RX ORDER — BISACODYL 10 MG
10 SUPPOSITORY, RECTAL RECTAL
Status: DISCONTINUED | OUTPATIENT
Start: 2020-02-25 | End: 2020-02-26

## 2020-02-25 RX ORDER — MULTIVITAMIN WITH FOLIC ACID 400 MCG
1 TABLET ORAL DAILY
COMMUNITY

## 2020-02-25 RX ORDER — DEXAMETHASONE SODIUM PHOSPHATE 4 MG/ML
VIAL (ML) INJECTION AS NEEDED
Status: DISCONTINUED | OUTPATIENT
Start: 2020-02-25 | End: 2020-02-25 | Stop reason: SURG

## 2020-02-25 RX ORDER — LISINOPRIL 20 MG/1
20 TABLET ORAL EVERY EVENING
Status: DISCONTINUED | OUTPATIENT
Start: 2020-02-25 | End: 2020-02-26

## 2020-02-25 RX ORDER — LEVOTHYROXINE SODIUM 0.1 MG/1
100 TABLET ORAL
Status: DISCONTINUED | OUTPATIENT
Start: 2020-02-26 | End: 2020-02-26

## 2020-02-25 RX ORDER — SODIUM PHOSPHATE, DIBASIC AND SODIUM PHOSPHATE, MONOBASIC 7; 19 G/133ML; G/133ML
1 ENEMA RECTAL ONCE AS NEEDED
Status: DISCONTINUED | OUTPATIENT
Start: 2020-02-25 | End: 2020-02-26

## 2020-02-25 RX ORDER — BUPIVACAINE HYDROCHLORIDE 5 MG/ML
INJECTION, SOLUTION EPIDURAL; INTRACAUDAL AS NEEDED
Status: DISCONTINUED | OUTPATIENT
Start: 2020-02-25 | End: 2020-02-25 | Stop reason: SURG

## 2020-02-25 RX ORDER — DOCUSATE SODIUM 100 MG/1
100 CAPSULE, LIQUID FILLED ORAL 2 TIMES DAILY
Status: DISCONTINUED | OUTPATIENT
Start: 2020-02-25 | End: 2020-02-26

## 2020-02-25 RX ORDER — LABETALOL HYDROCHLORIDE 5 MG/ML
5 INJECTION, SOLUTION INTRAVENOUS EVERY 5 MIN PRN
Status: DISCONTINUED | OUTPATIENT
Start: 2020-02-25 | End: 2020-02-25 | Stop reason: HOSPADM

## 2020-02-25 RX ORDER — DIPHENHYDRAMINE HYDROCHLORIDE 50 MG/ML
25 INJECTION INTRAMUSCULAR; INTRAVENOUS ONCE AS NEEDED
Status: ACTIVE | OUTPATIENT
Start: 2020-02-25 | End: 2020-02-25

## 2020-02-25 RX ORDER — PROCHLORPERAZINE EDISYLATE 5 MG/ML
10 INJECTION INTRAMUSCULAR; INTRAVENOUS EVERY 6 HOURS PRN
Status: DISCONTINUED | OUTPATIENT
Start: 2020-02-25 | End: 2020-02-26

## 2020-02-25 RX ORDER — ACETAMINOPHEN 325 MG/1
TABLET ORAL
Status: COMPLETED
Start: 2020-02-25 | End: 2020-02-25

## 2020-02-25 RX ORDER — METOCLOPRAMIDE HYDROCHLORIDE 5 MG/ML
10 INJECTION INTRAMUSCULAR; INTRAVENOUS EVERY 6 HOURS PRN
Status: DISCONTINUED | OUTPATIENT
Start: 2020-02-25 | End: 2020-02-26

## 2020-02-25 RX ORDER — SODIUM CHLORIDE, SODIUM LACTATE, POTASSIUM CHLORIDE, CALCIUM CHLORIDE 600; 310; 30; 20 MG/100ML; MG/100ML; MG/100ML; MG/100ML
INJECTION, SOLUTION INTRAVENOUS CONTINUOUS
Status: DISCONTINUED | OUTPATIENT
Start: 2020-02-25 | End: 2020-02-25 | Stop reason: HOSPADM

## 2020-02-25 RX ORDER — ZOLPIDEM TARTRATE 5 MG/1
5 TABLET ORAL NIGHTLY PRN
Status: DISCONTINUED | OUTPATIENT
Start: 2020-02-25 | End: 2020-02-26

## 2020-02-25 RX ORDER — ACETAMINOPHEN 325 MG/1
650 TABLET ORAL ONCE
Status: COMPLETED | OUTPATIENT
Start: 2020-02-25 | End: 2020-02-25

## 2020-02-25 RX ORDER — ONDANSETRON 2 MG/ML
4 INJECTION INTRAMUSCULAR; INTRAVENOUS EVERY 4 HOURS PRN
Status: DISCONTINUED | OUTPATIENT
Start: 2020-02-25 | End: 2020-02-26

## 2020-02-25 RX ORDER — MIDAZOLAM HYDROCHLORIDE 1 MG/ML
INJECTION INTRAMUSCULAR; INTRAVENOUS AS NEEDED
Status: DISCONTINUED | OUTPATIENT
Start: 2020-02-25 | End: 2020-02-25 | Stop reason: SURG

## 2020-02-25 RX ORDER — MIDAZOLAM HYDROCHLORIDE 1 MG/ML
1 INJECTION INTRAMUSCULAR; INTRAVENOUS EVERY 5 MIN PRN
Status: DISCONTINUED | OUTPATIENT
Start: 2020-02-25 | End: 2020-02-25 | Stop reason: HOSPADM

## 2020-02-25 RX ORDER — HYDROMORPHONE HYDROCHLORIDE 1 MG/ML
0.4 INJECTION, SOLUTION INTRAMUSCULAR; INTRAVENOUS; SUBCUTANEOUS EVERY 2 HOUR PRN
Status: DISCONTINUED | OUTPATIENT
Start: 2020-02-25 | End: 2020-02-26

## 2020-02-25 RX ORDER — MELATONIN
325
Status: DISCONTINUED | OUTPATIENT
Start: 2020-02-25 | End: 2020-02-26

## 2020-02-25 RX ORDER — CEFAZOLIN SODIUM/WATER 2 G/20 ML
2 SYRINGE (ML) INTRAVENOUS ONCE
Status: COMPLETED | OUTPATIENT
Start: 2020-02-25 | End: 2020-02-25

## 2020-02-25 RX ORDER — HYDROMORPHONE HYDROCHLORIDE 1 MG/ML
0.2 INJECTION, SOLUTION INTRAMUSCULAR; INTRAVENOUS; SUBCUTANEOUS EVERY 2 HOUR PRN
Status: DISCONTINUED | OUTPATIENT
Start: 2020-02-25 | End: 2020-02-26

## 2020-02-25 RX ORDER — METOPROLOL TARTRATE 50 MG/1
50 TABLET, FILM COATED ORAL
Status: DISCONTINUED | OUTPATIENT
Start: 2020-02-25 | End: 2020-02-26

## 2020-02-25 RX ORDER — MEPERIDINE HYDROCHLORIDE 25 MG/ML
12.5 INJECTION INTRAMUSCULAR; INTRAVENOUS; SUBCUTANEOUS AS NEEDED
Status: DISCONTINUED | OUTPATIENT
Start: 2020-02-25 | End: 2020-02-25 | Stop reason: HOSPADM

## 2020-02-25 RX ORDER — HYDROMORPHONE HYDROCHLORIDE 1 MG/ML
0.8 INJECTION, SOLUTION INTRAMUSCULAR; INTRAVENOUS; SUBCUTANEOUS EVERY 2 HOUR PRN
Status: DISCONTINUED | OUTPATIENT
Start: 2020-02-25 | End: 2020-02-26

## 2020-02-25 RX ORDER — TIZANIDINE 2 MG/1
2 TABLET ORAL 3 TIMES DAILY PRN
Status: DISCONTINUED | OUTPATIENT
Start: 2020-02-25 | End: 2020-02-26

## 2020-02-25 RX ORDER — ONDANSETRON 2 MG/ML
4 INJECTION INTRAMUSCULAR; INTRAVENOUS AS NEEDED
Status: DISCONTINUED | OUTPATIENT
Start: 2020-02-25 | End: 2020-02-25 | Stop reason: HOSPADM

## 2020-02-25 RX ORDER — ACETAMINOPHEN 500 MG
1000 TABLET ORAL ONCE
Status: DISCONTINUED | OUTPATIENT
Start: 2020-02-25 | End: 2020-02-25

## 2020-02-25 RX ADMIN — ROPIVACAINE HYDROCHLORIDE 40 ML: 5 INJECTION, SOLUTION EPIDURAL; INFILTRATION; PERINEURAL at 08:54:00

## 2020-02-25 RX ADMIN — ONDANSETRON 4 MG: 2 INJECTION INTRAMUSCULAR; INTRAVENOUS at 08:43:00

## 2020-02-25 RX ADMIN — DEXAMETHASONE SODIUM PHOSPHATE 4 MG: 4 MG/ML VIAL (ML) INJECTION at 08:43:00

## 2020-02-25 RX ADMIN — SODIUM CHLORIDE, SODIUM LACTATE, POTASSIUM CHLORIDE, CALCIUM CHLORIDE: 600; 310; 30; 20 INJECTION, SOLUTION INTRAVENOUS at 08:31:00

## 2020-02-25 RX ADMIN — BUPIVACAINE HYDROCHLORIDE 2.3 ML: 5 INJECTION, SOLUTION EPIDURAL; INTRACAUDAL at 08:05:00

## 2020-02-25 RX ADMIN — MIDAZOLAM HYDROCHLORIDE 2 MG: 1 INJECTION INTRAMUSCULAR; INTRAVENOUS at 07:56:00

## 2020-02-25 RX ADMIN — SODIUM CHLORIDE, SODIUM LACTATE, POTASSIUM CHLORIDE, CALCIUM CHLORIDE: 600; 310; 30; 20 INJECTION, SOLUTION INTRAVENOUS at 09:28:00

## 2020-02-25 RX ADMIN — SODIUM CHLORIDE, SODIUM LACTATE, POTASSIUM CHLORIDE, CALCIUM CHLORIDE: 600; 310; 30; 20 INJECTION, SOLUTION INTRAVENOUS at 09:38:00

## 2020-02-25 RX ADMIN — CEFAZOLIN SODIUM/WATER 2 G: 2 G/20 ML SYRINGE (ML) INTRAVENOUS at 08:11:00

## 2020-02-25 NOTE — OPERATIVE REPORT
[facility]    PATIENT'S NAME: Britany Stacy   ATTENDING PHYSICIAN: Steve New M.D. OPERATING PHYSICIAN: Steve New M.D.    PATIENT ACCOUNT#:   [de-identified]    LOCATION:  [address] [place] [address]  MEDICAL RECORD #:   [id number]       DATE OF BIRTH spacer block nicely recreates soft tissue balance. Provisional size E tibia is placed. Rotation is checked and double-checked. Femur is positioned. A 12 MC poly is placed. Excellent position, stability, and tracking of the patella are appreciated.   Pa

## 2020-02-25 NOTE — CONSULTS
[facility] hospitalist initial consult note  PCP Jay Allen MD   Consulted at the request of DR. Martinez  Reason for consult medical co-management  HPI 69 yo female with Multiple medical problems including but not limited to HTN, Hypothyroidism, OA here s removed in childhood around age 10   • UPPER GI ENDOSCOPY - REFERRAL  7/9/12 - [facility]    small gastric erosion, no etiology for bleeding identifiedesophageal and gastric bx negative   • UPPER GI ENDOSCOPY,DIAGNOSIS  9/2003    hiatal hernia     Family file      Intimate partner violence:        Fear of current or ex partner: Not on file        Emotionally abused: Not on file        Physically abused: Not on file        Forced sexual activity: Not on file    Other Topics      Concerns:        Not on file mouth daily.   , Disp: , Rfl:       ROS 10 systems reviewed and negaitve except as in HPI  PE    02/25/20  1103   BP: 135/62   Pulse: 57   Resp: 18   Temp: 98.6 °F (37 °C)     Gen: awake, alert, no respiratory distress  HEENT; mmm, anicteric  Neck no JVD  L

## 2020-02-25 NOTE — HOME CARE LIAISON
MET WITH PTNT AND OFFERED CHOICE  OF AGENCIES. PTNT AGREEABLE TO [facility]. MET WITH PTNT TO DISCUSS HOME HEALTH SERVICES AND COVERAGE CRITERIA. PTNT AGREEABLE TO [facility]. PTNT GIVEN RESIDENTIAL BROCHURE.  RESIDENTIAL WITH PROVIDE SN/PT ON DISC

## 2020-02-25 NOTE — ANESTHESIA PROCEDURE NOTES
Spinal Block  Performed by: Sowmya Hogan MD  Authorized by: Sowmya Hogan MD       General Information and Staff    Start Time:   Anesthesiologist: Sowmya Hogan MD  Performed by:   Anesthesiologist  Site identification: surface landmarks    Preanesthetic

## 2020-02-25 NOTE — HOME CARE LIAISON
[facility] approved premier   will call ptnt to arrange delivery    Thanks  Deanna Lovett

## 2020-02-25 NOTE — PROGRESS NOTES
Admitted to room via bed from PACU s/p left TKR. Oriented to room, safety precautions initiated. VSS on RA, SCDs on bilaterally. BLE numb d/t nerve block. Denies pain. Ace wrap and gel ice packs to left knee. DTV. IVF infusing.  Instructed on falls pro

## 2020-02-25 NOTE — ANESTHESIA POSTPROCEDURE EVALUATION
[address] Patient Status:  Outpatient in a Bed   Age/Gender 68year old female MRN [id number]   [facility] SURGERY Attending Octavia Gonzales MD   Hosp Day # 0 PCP Jessy Anderson MD       Anesthesia Post-op Note

## 2020-02-25 NOTE — INTERVAL H&P NOTE
Pre-op Diagnosis: Primary osteoarthritis of left knee [M17.12]    The above referenced H&P was reviewed by STEPHANIE Fernandez on 2/25/2020, the patient was examined and no significant changes have occurred in the patient's condition since the H&P was perf

## 2020-02-25 NOTE — ANESTHESIA PROCEDURE NOTES
Regional Block  Performed by: Dede Lou MD  Authorized by: Dede Lou MD       General Information and Staff    Start Time:   Anesthesiologist: Dede Lou MD  Performed by:   Anesthesiologist  Patient Location:  OR    Block Placement: [facility]

## 2020-02-25 NOTE — PHYSICAL THERAPY NOTE
PHYSICAL THERAPY KNEE EVALUATION - INPATIENT     Room Number: 376/376-A  Evaluation Date: 2/25/2020  Type of Evaluation: Initial  Physician Order: PT Eval and Treat    Presenting Problem: S/p Left TKA on 02/25/2020  Reason for Therapy: Mobility Dysfunction Performed by Lorenza Cagle MD at [address] [place] [address] Right 6/2014   • SHOULDER ARTHROSCOPY ROTATOR CUFF REPAIR Right 6/10/2014    Performed by Lorenza Cagle MD at [place] [address]      removed in childhood around  within functional limits except for the following: Left Knee ROM limited S/p Left TKA on 02/25/2020    Lower extremity strength is within functional limits except for the following:  Left LE strength limited S/p Left TKA on 02/25/2020    BALANCE  Static Si recliner, resting @ end of session. Reviewed  handouts for HEP & plan of care by PT, during this session.       Exercise/Education Provided:  Bed mobility  Energy conservation  Functional activity tolerated  Gait training  Neuromuscular re-educate  Lower th conservation;Patient education; Family education;Gait training;Neuromuscular re-educate;Range of motion;Strengthening;Stoop training;Stair training;Transfer training;Balance training  Rehab Potential : Good  Frequency (Obs): BID  Number of Visits to Meet Es

## 2020-02-25 NOTE — CM/SW NOTE
PT recommending HH/PT. [place] [facility] care with referral.  Liaison will meet with the patient/familyto provide choice, explanation of services, and financial disclosure. Orders and F2F entered.      HOME SITUATION  Type of Home: House(in A

## 2020-02-25 NOTE — BRIEF OP NOTE
Pre-Operative Diagnosis: Primary osteoarthritis of left knee [M17.12]     Post-Operative Diagnosis: Primary osteoarthritis of left knee [M17.12]      Procedure Performed:   Procedure(s):  LEFT TOTAL KNEE ARTHROPLASTY    Surgeon(s) and Role:     * Lupe

## 2020-02-26 VITALS
RESPIRATION RATE: 20 BRPM | BODY MASS INDEX: 35.83 KG/M2 | HEIGHT: 64 IN | HEART RATE: 58 BPM | DIASTOLIC BLOOD PRESSURE: 48 MMHG | TEMPERATURE: 98 F | OXYGEN SATURATION: 98 % | WEIGHT: 209.88 LBS | SYSTOLIC BLOOD PRESSURE: 157 MMHG

## 2020-02-26 LAB
ANION GAP SERPL CALC-SCNC: 3 MMOL/L (ref 0–18)
BUN BLD-MCNC: 29 MG/DL (ref 7–18)
BUN/CREAT SERPL: 26.6 (ref 10–20)
CALCIUM BLD-MCNC: 8.9 MG/DL (ref 8.5–10.1)
CHLORIDE SERPL-SCNC: 104 MMOL/L (ref 98–112)
CO2 SERPL-SCNC: 30 MMOL/L (ref 21–32)
CREAT BLD-MCNC: 1.09 MG/DL (ref 0.55–1.02)
DEPRECATED RDW RBC AUTO: 44 FL (ref 35.1–46.3)
ERYTHROCYTE [DISTWIDTH] IN BLOOD BY AUTOMATED COUNT: 13.5 % (ref 11–15)
GLUCOSE BLD-MCNC: 108 MG/DL (ref 70–99)
HCT VFR BLD AUTO: 38.1 % (ref 35–48)
HGB BLD-MCNC: 12.5 G/DL (ref 12–16)
MCH RBC QN AUTO: 28.9 PG (ref 26–34)
MCHC RBC AUTO-ENTMCNC: 32.8 G/DL (ref 31–37)
MCV RBC AUTO: 88 FL (ref 80–100)
OSMOLALITY SERPL CALC.SUM OF ELEC: 290 MOSM/KG (ref 275–295)
PLATELET # BLD AUTO: 169 10(3)UL (ref 150–450)
POTASSIUM SERPL-SCNC: 3.9 MMOL/L (ref 3.5–5.1)
RBC # BLD AUTO: 4.33 X10(6)UL (ref 3.8–5.3)
SODIUM SERPL-SCNC: 137 MMOL/L (ref 136–145)
WBC # BLD AUTO: 10.8 X10(3) UL (ref 4–11)

## 2020-02-26 PROCEDURE — 97116 GAIT TRAINING THERAPY: CPT

## 2020-02-26 PROCEDURE — 85027 COMPLETE CBC AUTOMATED: CPT | Performed by: ORTHOPAEDIC SURGERY

## 2020-02-26 PROCEDURE — 97150 GROUP THERAPEUTIC PROCEDURES: CPT

## 2020-02-26 PROCEDURE — 96375 TX/PRO/DX INJ NEW DRUG ADDON: CPT

## 2020-02-26 PROCEDURE — 80048 BASIC METABOLIC PNL TOTAL CA: CPT | Performed by: ORTHOPAEDIC SURGERY

## 2020-02-26 PROCEDURE — 97535 SELF CARE MNGMENT TRAINING: CPT

## 2020-02-26 PROCEDURE — 97165 OT EVAL LOW COMPLEX 30 MIN: CPT

## 2020-02-26 PROCEDURE — 96376 TX/PRO/DX INJ SAME DRUG ADON: CPT

## 2020-02-26 RX ORDER — HYDROCODONE BITARTRATE AND ACETAMINOPHEN 10; 325 MG/1; MG/1
2 TABLET ORAL EVERY 4 HOURS PRN
Status: DISCONTINUED | OUTPATIENT
Start: 2020-02-27 | End: 2020-02-26

## 2020-02-26 RX ORDER — HYDROCODONE BITARTRATE AND ACETAMINOPHEN 10; 325 MG/1; MG/1
1 TABLET ORAL EVERY 4 HOURS PRN
Status: DISCONTINUED | OUTPATIENT
Start: 2020-02-27 | End: 2020-02-26

## 2020-02-26 NOTE — PHYSICAL THERAPY NOTE
PHYSICAL THERAPY KNEE TREATMENT NOTE - INPATIENT     Room Number: 376/376-A     Session: 1&2   Number of Visits to Meet Established Goals: 3    Presenting Problem: S/p Left TKA on 02/25/2020    Problem List  Principal Problem:    Primary osteoarthritis of removed in childhood around age 10   • UPPER GI ENDOSCOPY - REFERRAL  7/9/12 - [facility]    small gastric erosion, no etiology for bleeding identifiedesophageal and gastric bx negative   • UPPER GI ENDOSCOPY,DIAGNOSIS  9/2003    hiatal hernia       SUBJECT on FIM definations    Skilled Therapy Provided: AM: Pt was wheeled to rehab gym and participated in seated and standing therex per TKA protocol. Pt required min A for set up and cues for technique and body mechanics.   Pt was gait trained 200 feet c RW and ongoing IP PT to maximize functional independence. The AM-PAC '6-Clicks' Inpatient Basic Mobility Short Form was completed and this patient is demonstrating a 41.77% degree of impairment in mobility.  Research supports that patients with this level of impa

## 2020-02-26 NOTE — PLAN OF CARE
D/C INSTRUCTIONS  GIVEN TO PT AND DAUGHTER. TO CALL PMD FOR CONCERN.  TO CALL SURGEON IF HAD QUESTIONS

## 2020-02-26 NOTE — PROGRESS NOTES
Patient and  (daughter) attended group discharge education class. Discharge education provided utilizing \"hip/knee replacement discharge instructions\" sheet. Teach back done. Questions solicited and answered. Tolerated activity well.

## 2020-02-26 NOTE — PROGRESS NOTES
[facility]  Orthopedic Surgery  Progress Note    Mark Busby Patient Status:  Outpatient in a Bed    1940 MRN [id number]   [facility] 3SW-A Attending David Loza MD   Hosp Day # 0 PCP Vadim Esposito MD     SUBJECT 2 weeks      Ariadne Tran MD  2/26/2020  7:10 AM

## 2020-02-26 NOTE — PLAN OF CARE
RECD ALERT ,AWAKE, ORIENTED. NO RESP DISTRESS. DENIES CALF PAIN OR SOB. SEE MAR FOR PAIN MEDS.  CALL LIGHT W/IN REACH TO CALL FOR ALL NEEDS AND ASSISTANCE

## 2020-02-26 NOTE — PROGRESS NOTES
[facility] hospitalist daily note    Patient was seen/examined on 2/26/20    S no chest pain,no SOB,no abd pain, no nausea/emesis  + urinating and + passing gas  Pain in the orthopedic surgery controlled    Medications in Epic    PE     02/26/20  1200   BP: 157/4

## 2020-02-26 NOTE — PLAN OF CARE
Patient gets up with min assist ,pain is well controlled with pain protocol ,vital signs stable ,denies any chest pain or short of breath ,dressing to left knee clean and dry ice in place ,encouraged use of incentive spirometer and ankle pumps ,all safety

## 2020-02-26 NOTE — OCCUPATIONAL THERAPY NOTE
OCCUPATIONAL THERAPY QUICK EVALUATION - INPATIENT    Room Number: 376/376-A  Evaluation Date: 2/26/2020     Type of Evaluation: Quick Eval  Presenting Problem: s/p L TKA 2/25/20    Physician Order: IP Consult to Occupational Therapy  Reason for Therapy:  A at [address]   [facility]    with B[facility]   • KNEE ARTHROSCOPY Right 5/15/2018    Performed by Zenia Morse MD at [address] [place] [address] Right 6/2014   • SHOULDER ARTHROSCOPY ACTIVITIES OF DAILY LIVING ASSESSMENT  AM-PAC ‘6-Clicks’ Inpatient Daily Activity Short Form   How much help from another person does the patient currently need…  -   Putting on and taking off regular lower body clothing?: A Little(supervision)   -   B transfers and dynamic reaching safely, without loss of balance, and at supervision to modified independent level; patient reports will have supervision at home from daughter, who she reports will be staying with her at discharge.  Patient also with good rec

## 2020-02-27 PROBLEM — Z47.89 ORTHOPEDIC AFTERCARE: Status: ACTIVE | Noted: 2020-02-27

## 2020-02-27 NOTE — CM/SW NOTE
Patient discharged on 2/26 as previously planned.        02/26/20 1100   Discharge disposition   Expected discharge disposition Home-Health   Name of Flores[facility] Proc. Bhavesh Cunningham 1 services after discharge Skilled home care;DME   HME provide

## 2020-03-02 PROBLEM — E66.01 MORBID OBESITY WITH BMI OF 40.0-44.9, ADULT (HCC): Status: ACTIVE | Noted: 2020-03-02

## 2021-03-10 PROBLEM — M70.21 OLECRANON BURSITIS OF RIGHT ELBOW: Status: ACTIVE | Noted: 2021-03-10

## 2021-08-11 PROBLEM — M17.12 OSTEOARTHRITIS OF LEFT KNEE, UNSPECIFIED OSTEOARTHRITIS TYPE: Status: RESOLVED | Noted: 2020-02-25 | Resolved: 2021-08-11

## 2021-08-11 PROBLEM — S83.91XA SPRAIN OF RIGHT KNEE, UNSPECIFIED LIGAMENT, INITIAL ENCOUNTER: Status: RESOLVED | Noted: 2017-11-02 | Resolved: 2021-08-11

## 2023-10-11 RX ORDER — ASPIRIN 81 MG/1
81 TABLET ORAL DAILY
COMMUNITY

## 2023-10-13 ENCOUNTER — TELEPHONE (OUTPATIENT)
Dept: GENERAL RADIOLOGY | Facility: HOSPITAL | Age: 83
End: 2023-10-13

## 2023-10-13 NOTE — TELEPHONE ENCOUNTER
1125: Christiana Ellison returned the breast care coordinator's call. Introduced myself and informed Ms. Amanda Harvey of the purpose of my call. Discussed localization procedure to be done in the women's imaging center prior to surgery Monday, October 30. Procedure and flow of the day reviewed. All questions answered. Ms. Amanda Harvey verbalized understanding   Encouraged Ms. Cantu to contact the breast center or Dr. Skylar Becker office if she has questions or concerns prior to her surgery date. Christiana Ellison verbalized agreement and gratitude for the information.

## 2023-10-23 ENCOUNTER — EKG ENCOUNTER (OUTPATIENT)
Dept: LAB | Age: 83
End: 2023-10-23
Attending: SURGERY
Payer: MEDICARE

## 2023-10-23 DIAGNOSIS — C50.919 BREAST CANCER (HCC): ICD-10-CM

## 2023-10-23 LAB
ATRIAL RATE: 61 BPM
P AXIS: 59 DEGREES
P-R INTERVAL: 244 MS
Q-T INTERVAL: 434 MS
QRS DURATION: 74 MS
QTC CALCULATION (BEZET): 436 MS
R AXIS: 44 DEGREES
T AXIS: 36 DEGREES
VENTRICULAR RATE: 61 BPM

## 2023-10-23 PROCEDURE — 93010 ELECTROCARDIOGRAM REPORT: CPT | Performed by: INTERNAL MEDICINE

## 2023-10-23 PROCEDURE — 93005 ELECTROCARDIOGRAM TRACING: CPT

## 2023-10-29 ENCOUNTER — ANESTHESIA EVENT (OUTPATIENT)
Dept: SURGERY | Facility: HOSPITAL | Age: 83
End: 2023-10-29
Payer: MEDICARE

## 2023-10-30 ENCOUNTER — HOSPITAL ENCOUNTER (OUTPATIENT)
Dept: MAMMOGRAPHY | Facility: HOSPITAL | Age: 83
Discharge: HOME | End: 2023-10-30
Attending: SURGERY
Payer: MEDICARE

## 2023-10-30 ENCOUNTER — HOSPITAL ENCOUNTER (OUTPATIENT)
Facility: HOSPITAL | Age: 83
Setting detail: HOSPITAL OUTPATIENT SURGERY
Discharge: HOME | End: 2023-10-30
Attending: SURGERY | Admitting: SURGERY
Payer: MEDICARE

## 2023-10-30 ENCOUNTER — ANESTHESIA (OUTPATIENT)
Dept: SURGERY | Facility: HOSPITAL | Age: 83
End: 2023-10-30
Payer: MEDICARE

## 2023-10-30 VITALS
WEIGHT: 224 LBS | TEMPERATURE: 98 F | HEART RATE: 61 BPM | BODY MASS INDEX: 38.24 KG/M2 | DIASTOLIC BLOOD PRESSURE: 75 MMHG | OXYGEN SATURATION: 98 % | RESPIRATION RATE: 18 BRPM | SYSTOLIC BLOOD PRESSURE: 138 MMHG | HEIGHT: 64 IN

## 2023-10-30 DIAGNOSIS — C50.919 BREAST CANCER (HCC): Primary | ICD-10-CM

## 2023-10-30 DIAGNOSIS — C50.919 BREAST CANCER (HCC): ICD-10-CM

## 2023-10-30 PROCEDURE — 88307 TISSUE EXAM BY PATHOLOGIST: CPT | Performed by: SURGERY

## 2023-10-30 PROCEDURE — 0HBU0ZZ EXCISION OF LEFT BREAST, OPEN APPROACH: ICD-10-PCS | Performed by: SURGERY

## 2023-10-30 PROCEDURE — 19281 PERQ DEVICE BREAST 1ST IMAG: CPT | Performed by: SURGERY

## 2023-10-30 PROCEDURE — 76098 X-RAY EXAM SURGICAL SPECIMEN: CPT | Performed by: SURGERY

## 2023-10-30 RX ORDER — SODIUM CHLORIDE, SODIUM LACTATE, POTASSIUM CHLORIDE, CALCIUM CHLORIDE 600; 310; 30; 20 MG/100ML; MG/100ML; MG/100ML; MG/100ML
INJECTION, SOLUTION INTRAVENOUS CONTINUOUS
Status: DISCONTINUED | OUTPATIENT
Start: 2023-10-30 | End: 2023-10-30

## 2023-10-30 RX ORDER — ALBUTEROL SULFATE 2.5 MG/3ML
2.5 SOLUTION RESPIRATORY (INHALATION) AS NEEDED
Status: DISCONTINUED | OUTPATIENT
Start: 2023-10-30 | End: 2023-10-30

## 2023-10-30 RX ORDER — CEFAZOLIN SODIUM/WATER 2 G/20 ML
2 SYRINGE (ML) INTRAVENOUS ONCE
Status: COMPLETED | OUTPATIENT
Start: 2023-10-30 | End: 2023-10-30

## 2023-10-30 RX ORDER — LIDOCAINE HYDROCHLORIDE 10 MG/ML
INJECTION, SOLUTION INFILTRATION; PERINEURAL AS NEEDED
Status: DISCONTINUED | OUTPATIENT
Start: 2023-10-30 | End: 2023-10-30 | Stop reason: HOSPADM

## 2023-10-30 RX ORDER — BUPIVACAINE HYDROCHLORIDE AND EPINEPHRINE 5; 5 MG/ML; UG/ML
INJECTION, SOLUTION EPIDURAL; INTRACAUDAL; PERINEURAL AS NEEDED
Status: DISCONTINUED | OUTPATIENT
Start: 2023-10-30 | End: 2023-10-30 | Stop reason: HOSPADM

## 2023-10-30 RX ORDER — ACETAMINOPHEN 500 MG
1000 TABLET ORAL ONCE
Status: DISCONTINUED | OUTPATIENT
Start: 2023-10-30 | End: 2023-10-30 | Stop reason: HOSPADM

## 2023-10-30 RX ORDER — PHENYLEPHRINE HCL 10 MG/ML
VIAL (ML) INJECTION AS NEEDED
Status: DISCONTINUED | OUTPATIENT
Start: 2023-10-30 | End: 2023-10-30 | Stop reason: SURG

## 2023-10-30 RX ORDER — HEPARIN SODIUM 5000 [USP'U]/ML
5000 INJECTION, SOLUTION INTRAVENOUS; SUBCUTANEOUS ONCE
Status: COMPLETED | OUTPATIENT
Start: 2023-10-30 | End: 2023-10-30

## 2023-10-30 RX ORDER — NALOXONE HYDROCHLORIDE 0.4 MG/ML
80 INJECTION, SOLUTION INTRAMUSCULAR; INTRAVENOUS; SUBCUTANEOUS AS NEEDED
Status: DISCONTINUED | OUTPATIENT
Start: 2023-10-30 | End: 2023-10-30

## 2023-10-30 RX ORDER — METOCLOPRAMIDE HYDROCHLORIDE 5 MG/ML
INJECTION INTRAMUSCULAR; INTRAVENOUS AS NEEDED
Status: DISCONTINUED | OUTPATIENT
Start: 2023-10-30 | End: 2023-10-30 | Stop reason: SURG

## 2023-10-30 RX ORDER — CEFAZOLIN SODIUM/WATER 2 G/20 ML
SYRINGE (ML) INTRAVENOUS
Status: DISCONTINUED
Start: 2023-10-30 | End: 2023-10-30

## 2023-10-30 RX ORDER — ONDANSETRON 2 MG/ML
INJECTION INTRAMUSCULAR; INTRAVENOUS AS NEEDED
Status: DISCONTINUED | OUTPATIENT
Start: 2023-10-30 | End: 2023-10-30 | Stop reason: SURG

## 2023-10-30 RX ORDER — LABETALOL HYDROCHLORIDE 5 MG/ML
5 INJECTION, SOLUTION INTRAVENOUS EVERY 5 MIN PRN
Status: DISCONTINUED | OUTPATIENT
Start: 2023-10-30 | End: 2023-10-30

## 2023-10-30 RX ORDER — OXYCODONE HYDROCHLORIDE AND ACETAMINOPHEN 5; 325 MG/1; MG/1
1 TABLET ORAL EVERY 4 HOURS PRN
Qty: 30 TABLET | Refills: 0 | Status: SHIPPED | OUTPATIENT
Start: 2023-10-30 | End: 2023-11-09

## 2023-10-30 RX ORDER — LIDOCAINE HYDROCHLORIDE 10 MG/ML
INJECTION, SOLUTION EPIDURAL; INFILTRATION; INTRACAUDAL; PERINEURAL AS NEEDED
Status: DISCONTINUED | OUTPATIENT
Start: 2023-10-30 | End: 2023-10-30 | Stop reason: SURG

## 2023-10-30 RX ORDER — HYDROMORPHONE HYDROCHLORIDE 1 MG/ML
0.4 INJECTION, SOLUTION INTRAMUSCULAR; INTRAVENOUS; SUBCUTANEOUS EVERY 5 MIN PRN
Status: DISCONTINUED | OUTPATIENT
Start: 2023-10-30 | End: 2023-10-30

## 2023-10-30 RX ORDER — HYDROCODONE BITARTRATE AND ACETAMINOPHEN 5; 325 MG/1; MG/1
2 TABLET ORAL ONCE AS NEEDED
Status: DISCONTINUED | OUTPATIENT
Start: 2023-10-30 | End: 2023-10-30

## 2023-10-30 RX ORDER — ACETAMINOPHEN 500 MG
1000 TABLET ORAL ONCE AS NEEDED
Status: DISCONTINUED | OUTPATIENT
Start: 2023-10-30 | End: 2023-10-30

## 2023-10-30 RX ORDER — MIDAZOLAM HYDROCHLORIDE 1 MG/ML
1 INJECTION INTRAMUSCULAR; INTRAVENOUS EVERY 5 MIN PRN
Status: DISCONTINUED | OUTPATIENT
Start: 2023-10-30 | End: 2023-10-30

## 2023-10-30 RX ORDER — DEXAMETHASONE SODIUM PHOSPHATE 4 MG/ML
VIAL (ML) INJECTION AS NEEDED
Status: DISCONTINUED | OUTPATIENT
Start: 2023-10-30 | End: 2023-10-30 | Stop reason: SURG

## 2023-10-30 RX ORDER — DIAZEPAM 5 MG/1
5 TABLET ORAL AS NEEDED
Status: DISCONTINUED | OUTPATIENT
Start: 2023-10-30 | End: 2023-10-30 | Stop reason: HOSPADM

## 2023-10-30 RX ORDER — HYDROMORPHONE HYDROCHLORIDE 1 MG/ML
0.2 INJECTION, SOLUTION INTRAMUSCULAR; INTRAVENOUS; SUBCUTANEOUS EVERY 5 MIN PRN
Status: DISCONTINUED | OUTPATIENT
Start: 2023-10-30 | End: 2023-10-30

## 2023-10-30 RX ORDER — KETOROLAC TROMETHAMINE 30 MG/ML
INJECTION, SOLUTION INTRAMUSCULAR; INTRAVENOUS AS NEEDED
Status: DISCONTINUED | OUTPATIENT
Start: 2023-10-30 | End: 2023-10-30 | Stop reason: SURG

## 2023-10-30 RX ORDER — HYDROMORPHONE HYDROCHLORIDE 1 MG/ML
0.6 INJECTION, SOLUTION INTRAMUSCULAR; INTRAVENOUS; SUBCUTANEOUS EVERY 5 MIN PRN
Status: DISCONTINUED | OUTPATIENT
Start: 2023-10-30 | End: 2023-10-30

## 2023-10-30 RX ORDER — HYDROCODONE BITARTRATE AND ACETAMINOPHEN 5; 325 MG/1; MG/1
1 TABLET ORAL ONCE AS NEEDED
Status: DISCONTINUED | OUTPATIENT
Start: 2023-10-30 | End: 2023-10-30

## 2023-10-30 RX ADMIN — PHENYLEPHRINE HCL 100 MCG: 10 MG/ML VIAL (ML) INJECTION at 10:12:00

## 2023-10-30 RX ADMIN — KETOROLAC TROMETHAMINE 30 MG: 30 INJECTION, SOLUTION INTRAMUSCULAR; INTRAVENOUS at 10:30:00

## 2023-10-30 RX ADMIN — PHENYLEPHRINE HCL 100 MCG: 10 MG/ML VIAL (ML) INJECTION at 10:07:00

## 2023-10-30 RX ADMIN — SODIUM CHLORIDE, SODIUM LACTATE, POTASSIUM CHLORIDE, CALCIUM CHLORIDE: 600; 310; 30; 20 INJECTION, SOLUTION INTRAVENOUS at 09:52:00

## 2023-10-30 RX ADMIN — ONDANSETRON 4 MG: 2 INJECTION INTRAMUSCULAR; INTRAVENOUS at 10:30:00

## 2023-10-30 RX ADMIN — LIDOCAINE HYDROCHLORIDE 50 MG: 10 INJECTION, SOLUTION EPIDURAL; INFILTRATION; INTRACAUDAL; PERINEURAL at 09:57:00

## 2023-10-30 RX ADMIN — METOCLOPRAMIDE HYDROCHLORIDE 10 MG: 5 INJECTION INTRAMUSCULAR; INTRAVENOUS at 09:57:00

## 2023-10-30 RX ADMIN — CEFAZOLIN SODIUM/WATER 2 G: 2 G/20 ML SYRINGE (ML) INTRAVENOUS at 10:07:00

## 2023-10-30 RX ADMIN — DEXAMETHASONE SODIUM PHOSPHATE 4 MG: 4 MG/ML VIAL (ML) INJECTION at 09:57:00

## 2023-10-30 NOTE — BRIEF OP NOTE
Pre-Operative Diagnosis: BREAST CANCER     Post-Operative Diagnosis: BREAST CANCER      Procedure Performed:   LEFT BREAST NEEDLE LOCALIZATION LUMPECTOMY    Surgeon(s) and Role:     Pamela Kulkarni MD - Primary    Assistant(s):  Surgical Assistant.: Danita Hurley CSA     Surgical Findings: see dictation     Specimen: Jarod El     Estimated Blood Loss: Blood Output: 5 mL (10/30/2023 10:28 AM)          Suzanna Petty MD  10/30/2023  10:30 AM

## 2023-10-30 NOTE — OPERATIVE REPORT
[facility]    PATIENT'S NAME: Luciano Pollard   ATTENDING PHYSICIAN: Jez Porras M.D. OPERATING PHYSICIAN: Jez Porras M.D. PATIENT ACCOUNT#:   [de-identified]    LOCATION[facility] 23 EDW 10  MEDICAL RECORD #:   [id number]       YOB: 1940  ADMISSION DATE:       10/30/2023      OPERATION DATE:  10/30/2023    OPERATIVE REPORT      PREOPERATIVE DIAGNOSIS:  Ductal carcinoma in situ, left breast.  POSTOPERATIVE DIAGNOSIS:  Ductal carcinoma in situ, left breast.  PROCEDURE:    1. Needle-localization left breast lumpectomy with margins. 2.   Intraoperative specimen mammographic interpretation. ASSISTANT:  IAN Smith. Surgical assistant was medically necessary for the entirety of this case and helped with positioning, prepping, opening, closing, suturing, and retraction. ANESTHESIA:  General.    ESTIMATED BLOOD LOSS:  5 mL. OPERATIVE TECHNIQUE:  The patient was taken to the operating suite after informed consent and proper identification. The patient underwent needle localization of the microcalcifications and previous biopsy site of the left breast.  The patient was administered a general anesthetic. The patient's left breast was prepped and draped in usual sterile fashion. A marking pen was utilized to plan a line of incision to encompass the guidewire and area in question. The area was infiltrated with local anesthesia. Skin incision was performed with a #15 scalpel. Dissection proceeded through the subcutaneous tissues. The guidewire was delivered into the incisional site. Circumferential excision of the area in question was performed using electrocautery, taking a wide margin circumferentially. The specimen, once removed, it was then x-rayed in the operating room with specimen mammography. This confirmed the clip to be in the center of the specimen with the guidewire with good radiographic margins. The wound was irrigated.   Hemostasis was achieved with electrocautery. Subcutaneous tissues were approximated with 3-0 Vicryl. Skin was closed with 4-0 Vicryl in a subcuticular fashion. Dermabond was placed directly over the incision. Wounds were sterilely dressed. Patient tolerated the procedure well.     Dictated By Sree Chen M.D.  d: 10/30/2023 10:33:43  t: 10/30/2023 14:04:20  [facility] [id number]  WANDA/

## 2023-10-30 NOTE — H&P
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 Patient underwent a screening mammogram which showed microcalcifications within the left breast. She underwent a stereotactic breast biopsy which confirmed DCIS. Patient also had a benign cyst in her right breast. Patient has a history of a previous excision of a benign lesion of her right breast over 20 years ago. She does have a sister and aunt with a history of breast cancer. Mammogram: Reviewed     Ultrasound: Reviewed    Past Medical History:   Diagnosis Date   Colon adenoma 2011   Compression of lumbar nerve root 2009   Diaphragmatic hernia without mention of obstruction or gangrene   Esophageal reflux 2008   High blood pressure   Hypothyroidism   Mixed hyperlipidemia   Obesity (BMI 30-39. 9)   Osteoarthrosis, unspecified whether generalized or localized, unspecified site   Unspecified vitamin D deficiency     Past Surgical History:   Procedure Laterality Date   COLONOSCOPY,DIAGNOSTIC 2016   normal   EXCISIONAL BIOSPY RIGHT 1995   INGUINAL HERNIA REPAIR 1985   KNEE ARTHROSCOPY Bilateral   REPAIR ROTATOR CUFF,ACUTE Right 2014   TONSILLECTOMY   TOTAL ABDOM HYSTERECTOMY 1985     Current Outpatient Medications   Medication Sig Dispense Refill   lisinopril-hydroCHLOROthiazide 20-12.5 MG Oral Tab Take 1 tablet by mouth daily. 90 tablet 1   lisinopril 20 MG Oral Tab Take 1 tablet (20 mg total) by mouth daily. 90 tablet 1   metoprolol tartrate 50 MG Oral Tab Take 1 tablet (50 mg total) by mouth 2 (two) times daily. 180 tablet 1   aspirin (ECOTRIN LOW STRENGTH) 81 MG Oral Tab EC Take 1 tablet (81 mg total) by mouth daily. 0   levothyroxine 50 MCG Oral Tab Take 1 tablet (50 mcg total) by mouth Every Sunday for 15 doses. 15 tablet 0   MELATONIN OR Take by mouth as needed. NON FORMULARY one time.  Fruit and veggie vitamin   levothyroxine 100 MCG Oral Tab Take one table Monday/ Tuesday/ Wednesday/ Thursday/ Friday and Saturday only 90 tablet 0   Meloxicam 15 MG Oral Tab Take 1 tablet (15 mg total) by mouth daily. 30 tablet 1   Multiple Vitamin (TAB-A-AGUEDA) Oral Tab Take 1 tablet by mouth daily. CALCIUM 600 + D OR Take 1 tablet by mouth daily. Erythromycin Base NAUSEA AND VOMITING  Talwin [Pentazocine* NAUSEA AND VOMITING  Comment:CRAMP  Levofloxacin [Levof*   Comment:Pt does want to take due to possible side effects  Daypro [Oxaprozin] NAUSEA ONLY  Tetracycline Base RASH    Family History   Problem Relation Age of Onset   Lipids Father   Hypertension Father   Heart Disorder Father   Thyroid disease Daughter   Thyroid disease Daughter   Thyroid disease Daughter   Breast Cancer Sister 49   51   Thyroid disease Sister   Breast Cancer Cousin 54   51   Breast Cancer Maternal Aunt 59   60     Social History  Tobacco Use  Smoking status: Never  Smokeless tobacco: Never  Vaping Use  Vaping Use: Never used  Alcohol use: Yes  Comment: 1-3 a week  Drug use: No    ROS:    10 point review performed with pertinent positives and negatives per HPI    EXAM:    GENERAL: well developed, well nourished female, in no apparent distress  SKIN: anicteric  HEENT: normocephalic; sclera anicteric  NECK: supple, no JVD  RESPIRATORY: clear to auscultation  CARDIOVASCULAR: RRR  ABDOMEN: normal active BS, soft and no tenderness, no mass  LYMPHATIC: no lymphadenopathy  EXTREMITIES: no cyanosis or edema  BREASTS: Left breast reveals a biopsy site which is healing well with no sign of infection. No palpable mass or asymmetry. Right breast reveals no palpable mass or asymmetry. No palpable axillary lymphadenopathy. IMPRESSION:    1. Left breast DCIS measuring about 10 mm    2. Atrial flutter    3. Hypertension    I had a lengthy discussion with the patient regarding the diagnosis of breast cancer. We discussed the biology of breast cancer as well as the difference between in situ and invasive disease.      We discussed the treatment options of breast cancer in regards to surgery, radiation, chemotherapy, endocrine therapy and herceptin. In regards to surgery, we discussed the options of lumpectomy and mastectomy and the difference between each option. The possibility of another surgery to achieve clear margins was also discussed. If she opts for mastectomy, the option of reconstruction was discussed. She does express understanding of the above. The risks of surgery were discussed including bleeding,infection,need for reoperation,arm swelling. She expressed understanding .

## 2023-10-30 NOTE — ANESTHESIA PROCEDURE NOTES
Airway  Date/Time: 10/30/2023 9:58 AM  Urgency: elective    Airway not difficult    General Information and Staff    Patient location during procedure: OR  Anesthesiologist: Nestor Shah MD  Performed: anesthesiologist   Performed by: Nestor Shah MD  Authorized by:  Nestor Shah MD      Indications and Patient Condition  Indications for airway management: anesthesia  Sedation level: deep  Preoxygenated: yes  Patient position: sniffing  Mask difficulty assessment: 0 - not attempted    Final Airway Details  Final airway type: supraglottic airway      Successful airway: classic  Size 4       Number of attempts at approach: 1  Number of other approaches attempted: 0

## 2023-10-30 NOTE — DISCHARGE INSTRUCTIONS
BREAST BIOPSY, Caitlyn Mccullough Neighbours should anticipate mild to moderate pain for the first few days. Your surgeon has prescribed for you a pain medication, usually Norco.  Take  the pain medication as prescribed. You can use Ibuprofen (Motrin) 600 mg three times a day with the Norco or by itself for pain if needed. If you experience severe nausea or vomiting stop the pain medication and call our office. DIET  You may resume a normal diet after surgery. Do not drink alcohol while taking the pain medication. ACTIVITY  You should not drive while you are taking prescription pain medication. No lifting greater than 10 pounds for 1 week after surgery. Avoid strenuous activity such as running and physical workouts for 1 week after surgery  Normal daily activities are fine, such as climbing stairs. You may shower after 24 hours. The steri-strips can get wet but not under water in a bath. You may return to work as instructed by your surgeon. CARE OF SURGICAL SITE  You may wear a bra 24 hours a day for comfort if you wish. The incision has steri-strips directly on the incision. You should leave these on until seen in the office. The outer gauze dressing can be removed in 24 hours and replaced as needed. Your pathology report will be discussed with you during your first post surgery office visit. If you experience fever, chills or severe pain please contact your surgeon. APPOINTMENT  You should make an appointment to see your surgeon in 3-5 days. Should you develop any problems prior to your scheduled appointment contact our office.         [id number]  [phone]

## 2023-10-30 NOTE — IMAGING NOTE
Assisted  with mammography guided needle localization of the left breast.   Freddie Alba identified with spelling of name and date of birth. Medications and allergies reviewed. The following allergies were reported:   Erythromycin BaseNAUSEA AND VOMITING  Talwin [Pentazocine Lactate]NAUSEA AND VOMITING  Levofloxacin [Quixin]  Daypro [Oxaprozin]NAUSEA ONLY  Tetracycline BaseRASH     History: left breast--Ductal carcinoma in situ    Surgery: LEFT BREAST NEEDLE LOCALIZATION LUMPECTOMY     Order verified. Procedure explained and questions answered. Freddie Alba verbalized understanding and agreement. 0840: Written consent obtained. Awaiting radiologist availability    6695: Scans taken by Layne Ear- mammography technologist    9676: Site prepped in a sterile manner. 9351: Dr. Filomena Brock present    [id number]: Time out complete.    0909: Lidocaine administered for anesthetic affect. 9684: [place] 20G x 5cm needle placed- left breast  Emotional support provided. Freddie Alba tolerated procedure well. Site cleaned. Wire secured with sterile 4x4 gauze dressing, Transpore tape, and a styrofoam cup. Freddie Alba transported via wheelchair to pre-op/surgery holding in stable condition. Ms. Maya Hippo without complaints or concerns at this time.

## 2024-05-12 ENCOUNTER — HOSPITAL ENCOUNTER (EMERGENCY)
Facility: HOSPITAL | Age: 84
Discharge: HOME | End: 2024-05-12
Attending: EMERGENCY MEDICINE

## 2024-05-12 ENCOUNTER — APPOINTMENT (OUTPATIENT)
Dept: GENERAL RADIOLOGY | Facility: HOSPITAL | Age: 84
End: 2024-05-12

## 2024-05-12 VITALS
DIASTOLIC BLOOD PRESSURE: 67 MMHG | HEIGHT: 64 IN | HEART RATE: 58 BPM | BODY MASS INDEX: 37.56 KG/M2 | TEMPERATURE: 98 F | RESPIRATION RATE: 13 BRPM | OXYGEN SATURATION: 100 % | WEIGHT: 220 LBS | SYSTOLIC BLOOD PRESSURE: 141 MMHG

## 2024-05-12 DIAGNOSIS — I48.92 ATRIAL FLUTTER, PAROXYSMAL (HCC): Primary | ICD-10-CM

## 2024-05-12 DIAGNOSIS — I47.10 PSVT (PAROXYSMAL SUPRAVENTRICULAR TACHYCARDIA) (HCC): ICD-10-CM

## 2024-05-12 LAB
ALBUMIN SERPL-MCNC: 3.7 G/DL (ref 3.4–5)
ALBUMIN/GLOB SERPL: 1.1 {RATIO} (ref 1–2)
ALP LIVER SERPL-CCNC: 100 U/L
ALT SERPL-CCNC: 25 U/L
ANION GAP SERPL CALC-SCNC: 4 MMOL/L (ref 0–18)
AST SERPL-CCNC: 27 U/L (ref 15–37)
ATRIAL RATE: 66 BPM
BASOPHILS # BLD AUTO: 0.04 X10(3) UL (ref 0–0.2)
BASOPHILS NFR BLD AUTO: 0.5 %
BILIRUB SERPL-MCNC: 0.6 MG/DL (ref 0.1–2)
BUN BLD-MCNC: 28 MG/DL (ref 9–23)
CALCIUM BLD-MCNC: 9.8 MG/DL (ref 8.5–10.1)
CHLORIDE SERPL-SCNC: 106 MMOL/L (ref 98–112)
CO2 SERPL-SCNC: 29 MMOL/L (ref 21–32)
CREAT BLD-MCNC: 1.33 MG/DL
EGFRCR SERPLBLD CKD-EPI 2021: 40 ML/MIN/1.73M2 (ref 60–?)
EOSINOPHIL # BLD AUTO: 0.22 X10(3) UL (ref 0–0.7)
EOSINOPHIL NFR BLD AUTO: 2.6 %
ERYTHROCYTE [DISTWIDTH] IN BLOOD BY AUTOMATED COUNT: 14 %
GLOBULIN PLAS-MCNC: 3.4 G/DL (ref 2.8–4.4)
GLUCOSE BLD-MCNC: 90 MG/DL (ref 70–99)
HCT VFR BLD AUTO: 45 %
HGB BLD-MCNC: 14.5 G/DL
IMM GRANULOCYTES # BLD AUTO: 0.04 X10(3) UL (ref 0–1)
IMM GRANULOCYTES NFR BLD: 0.5 %
LYMPHOCYTES # BLD AUTO: 1.38 X10(3) UL (ref 1–4)
LYMPHOCYTES NFR BLD AUTO: 16 %
MCH RBC QN AUTO: 29.6 PG (ref 26–34)
MCHC RBC AUTO-ENTMCNC: 32.2 G/DL (ref 31–37)
MCV RBC AUTO: 91.8 FL
MONOCYTES # BLD AUTO: 1.05 X10(3) UL (ref 0.1–1)
MONOCYTES NFR BLD AUTO: 12.2 %
NEUTROPHILS # BLD AUTO: 5.87 X10 (3) UL (ref 1.5–7.7)
NEUTROPHILS # BLD AUTO: 5.87 X10(3) UL (ref 1.5–7.7)
NEUTROPHILS NFR BLD AUTO: 68.2 %
OSMOLALITY SERPL CALC.SUM OF ELEC: 293 MOSM/KG (ref 275–295)
P AXIS: 35 DEGREES
P-R INTERVAL: 238 MS
PLATELET # BLD AUTO: 206 10(3)UL (ref 150–450)
POTASSIUM SERPL-SCNC: 3.9 MMOL/L (ref 3.5–5.1)
PROT SERPL-MCNC: 7.1 G/DL (ref 6.4–8.2)
Q-T INTERVAL: 388 MS
QRS DURATION: 76 MS
QTC CALCULATION (BEZET): 406 MS
R AXIS: 45 DEGREES
RBC # BLD AUTO: 4.9 X10(6)UL
SODIUM SERPL-SCNC: 139 MMOL/L (ref 136–145)
T AXIS: 37 DEGREES
TROPONIN I SERPL HS-MCNC: 15 NG/L
TSI SER-ACNC: 3.32 MIU/ML (ref 0.36–3.74)
VENTRICULAR RATE: 66 BPM
WBC # BLD AUTO: 8.6 X10(3) UL (ref 4–11)

## 2024-05-12 PROCEDURE — 99285 EMERGENCY DEPT VISIT HI MDM: CPT

## 2024-05-12 PROCEDURE — 93010 ELECTROCARDIOGRAM REPORT: CPT

## 2024-05-12 PROCEDURE — 36415 COLL VENOUS BLD VENIPUNCTURE: CPT

## 2024-05-12 PROCEDURE — 80053 COMPREHEN METABOLIC PANEL: CPT | Performed by: EMERGENCY MEDICINE

## 2024-05-12 PROCEDURE — 99284 EMERGENCY DEPT VISIT MOD MDM: CPT

## 2024-05-12 PROCEDURE — 84484 ASSAY OF TROPONIN QUANT: CPT

## 2024-05-12 PROCEDURE — 85025 COMPLETE CBC W/AUTO DIFF WBC: CPT

## 2024-05-12 PROCEDURE — 80053 COMPREHEN METABOLIC PANEL: CPT

## 2024-05-12 PROCEDURE — 84484 ASSAY OF TROPONIN QUANT: CPT | Performed by: EMERGENCY MEDICINE

## 2024-05-12 PROCEDURE — 71045 X-RAY EXAM CHEST 1 VIEW: CPT | Performed by: EMERGENCY MEDICINE

## 2024-05-12 PROCEDURE — 85025 COMPLETE CBC W/AUTO DIFF WBC: CPT | Performed by: EMERGENCY MEDICINE

## 2024-05-12 PROCEDURE — 84443 ASSAY THYROID STIM HORMONE: CPT | Performed by: EMERGENCY MEDICINE

## 2024-05-12 PROCEDURE — 93005 ELECTROCARDIOGRAM TRACING: CPT

## 2024-05-12 NOTE — ED PROVIDER NOTES
Patient Seen in: [facility] Emergency Department      History     Chief Complaint   Patient presents with    Arrythmia/Palpitations    Dizziness     Stated Complaint: Patient in stating she has just retruned from [place] and states she has arrythmia a*    Subjective:   HPI    Episode of elevated heart rate 2 times this week after very busy stressful week     History of SVT and atrial flutter   Heather suggested eliquis as did Syed- she did not start it  Objective:   Past Medical History:    Arrhythmia    SVT AND ATRIAL FLUTTER    Colon adenoma    Compression of lumbar nerve root    Degenerative lumbar spinal stenosis    Diaphragmatic hernia without mention of obstruction or gangrene    Disorder of thyroid    Esophageal reflux    Hearing impairment    Roman HA's    High blood pressure    History of stomach ulcers    A FEW YEARS AGO    Hypothyroidism    Mixed hyperlipidemia    OBESITY    Osteoarthrosis, unspecified whether generalized or localized, unspecified site    TINNITUS NOS    Unspecified vitamin D deficiency    Visual impairment    glasses              Past Surgical History:   Procedure Laterality Date    Arthroscopy of joint unlisted Right     knee    Colonoscopy  9/2003    normal    Colonoscopy  10/6/11 - [facility]    adenoma, hemorrhoids. repeat 2021.    Colonoscopy,diagnostic  11/17/16    normal    Excisional biospy right  1995    benign    Hernia surgery  1985    inguinal    Hysterectomy  1985    with BSO    Repair rotator cuff,acute Right 6/2014    Tonsillectomy      removed in childhood around age 12    Upper gi endoscopy - referral  7/9/12 - [facility]    small gastric erosion, no etiology for bleeding identifiedesophageal and gastric bx negative    Upper gi endoscopy,diagnosis  9/2003    hiatal hernia                Social History     Socioeconomic History    Marital status:    Tobacco Use    Smoking status: Never    Smokeless tobacco: Never   Vaping Use    Vaping status: Never Used   Substance and  The patient is a 44y Female complaining of multiple medical complaints. Sexual Activity    Alcohol use: Yes     Comment: 1-3 a week    Drug use: No    Sexual activity: Never   Social History Narrative    : 1962    Children: 4    Exercise: walks water and dance aerobics    Employment: retired real estate    Caffeine intake: minimal    Went to HS with [employer], class 1960              Review of Systems    Positive for stated complaint: Patient in stating she has just retruned from [place] and states she has arrythmia a*  Other systems are as noted in HPI.  Constitutional and vital signs reviewed.      All other systems reviewed and negative except as noted above.    Physical Exam     ED Triage Vitals [05/12/24 1202]   /78   Pulse 70   Resp 14   Temp 97.6 °F (36.4 °C)   Temp src Oral   SpO2 94 %   O2 Device None (Room air)       Current Vitals:   Vital Signs  BP: 139/67  Pulse: 63  Resp: 13  Temp: 97.6 °F (36.4 °C)  Temp src: Oral  MAP (mmHg): 88    Oxygen Therapy  SpO2: 98 %  O2 Device: None (Room air)            Physical Exam    ***      ED Course     Labs Reviewed   COMP METABOLIC PANEL (14) - Abnormal; Notable for the following components:       Result Value    BUN 28 (*)     Creatinine 1.33 (*)     eGFR-Cr 40 (*)     All other components within normal limits   CBC W/ DIFFERENTIAL - Abnormal; Notable for the following components:    Monocyte Absolute 1.05 (*)     All other components within normal limits   TROPONIN I HIGH SENSITIVITY - Normal   CBC WITH DIFFERENTIAL WITH PLATELET    Narrative:     The following orders were created for panel order CBC With Differential With Platelet.  Procedure                               Abnormality         Status                     ---------                               -----------         ------                     CBC W/ DIFFERENTIAL[[id number]]          Abnormal            Final result                 Please view results for these tests on the individual orders.   RAINBOW DRAW LAVENDER   RAINBOW DRAW LIGHT GREEN    RAINBOW DRAW BLUE     EKG    Rate, intervals and axes as noted on EKG Report.  Rate: 66  Rhythm: Sinus Rhythm  Reading: sinus rhythm with first degree A-V block and                  ***         MDM      ***                                   Medical Decision Making      Disposition and Plan     Clinical Impression:  No diagnosis found.     Disposition:  There is no disposition on file for this visit.  There is no disposition time on file for this visit.    Follow-up:  No follow-up provider specified.        Medications Prescribed:  Current Discharge Medication List                                          tests on the individual orders.   RAINBOW DRAW LAVENDER   RAINBOW DRAW LIGHT GREEN   RAINBOW DRAW BLUE     EKG    Rate, intervals and axes as noted on EKG Report.  Rate: 66  Rhythm: Sinus Rhythm  Reading: sinus rhythm with first degree A-V block and             Chest x-ray is independently interpreted by myself does not show any signs of pulmonary vascular congestion no masses or nodules or neoplastic lesions noted, heart border is benign.  No free air under the diaphragm.  There does not appear to be any pneumonia or widened mediastinum to suggest aortic pathology           MDM      84-year-old patient presents to the emergency department with 2 episodes of palpitations last week she gets them at rest when she gets them with exertion.  She has been under a great deal of stress and has been overly fatigued she has a history of both SVT and A-fib differential diagnosis includes episodes of SVT and A-fib, dehydration, hyperglycemia, pulmonary embolus, sinus tachycardia, anxiety episodes, hyperthyroidism, acute coronary syndrome      I considered the possibility of pulmonary embolus because she was in [place] this past week but these episodes have been happening for a long time she has no significant swelling or pain in her legs or arms they are episodic and not continuous now in the emergency department she has a pulse of 58 and oxygen saturations are 100%.  She has never had a clot before and truly with a history of the same her story sounds much more like intermittent arrhythmias especially because she was very fatigued hot and overly tired    I think it is most likely that the patient is either having episodes of SVT or A-fib again.  We had a long discussion why it is really important at 84 years old with an increased cardiovascular risk to take a blood thinner with those kinds of episodes especially as they are increasing in frequency.  She was willing to do that and follow-up with her cardiologist this week                              Medical Decision Making      Disposition and Plan     Clinical Impression:  1. Atrial flutter, paroxysmal (HCC)    2. PSVT (paroxysmal supraventricular tachycardia) (HCC)         Disposition:  Discharge  5/12/2024  2:35 pm    Follow-up:  No follow-up provider specified.        Medications Prescribed:  Discharge Medication List as of 5/12/2024  2:48 PM        START taking these medications    Details   apixaban 5 MG Oral Tab Take 1 tablet (5 mg total) by mouth 2 (two) times daily., Normal, Disp-60 tablet, R-0

## 2024-05-12 NOTE — ED INITIAL ASSESSMENT (HPI)
Pt c/o palpitations and left sided chest pain, states returned from [place] recently.  Also c/o feeling dizzy.

## 2024-07-16 RX ORDER — MOMETASONE FUROATE 1 MG/G
1 CREAM TOPICAL 2 TIMES DAILY PRN
Status: ON HOLD | COMMUNITY
Start: 2024-01-17 | End: 2024-08-05 | Stop reason: CLARIF

## 2024-07-16 RX ORDER — LEVOTHYROXINE SODIUM 88 UG/1
88 TABLET ORAL
COMMUNITY

## 2024-07-24 ENCOUNTER — HOSPITAL ENCOUNTER (OUTPATIENT)
Dept: PHYSICAL THERAPY | Facility: HOSPITAL | Age: 84
Discharge: HOME | End: 2024-07-24
Attending: ORTHOPAEDIC SURGERY
Payer: MEDICARE

## 2024-07-24 DIAGNOSIS — M48.061 LUMBAR STENOSIS: ICD-10-CM

## 2024-08-05 ENCOUNTER — APPOINTMENT (OUTPATIENT)
Dept: GENERAL RADIOLOGY | Facility: HOSPITAL | Age: 84
End: 2024-08-05
Attending: ORTHOPAEDIC SURGERY
Payer: MEDICARE

## 2024-08-05 ENCOUNTER — ANESTHESIA (OUTPATIENT)
Dept: SURGERY | Facility: HOSPITAL | Age: 84
End: 2024-08-05
Payer: MEDICARE

## 2024-08-05 ENCOUNTER — ANESTHESIA EVENT (OUTPATIENT)
Dept: SURGERY | Facility: HOSPITAL | Age: 84
End: 2024-08-05
Payer: MEDICARE

## 2024-08-05 ENCOUNTER — HOSPITAL ENCOUNTER (INPATIENT)
Facility: HOSPITAL | Age: 84
LOS: 3 days | Discharge: HOME | End: 2024-08-08
Attending: ORTHOPAEDIC SURGERY | Admitting: ORTHOPAEDIC SURGERY
Payer: MEDICARE

## 2024-08-05 DIAGNOSIS — M48.061 LUMBAR STENOSIS: Primary | ICD-10-CM

## 2024-08-05 PROCEDURE — 0SG1071 FUSION OF 2 OR MORE LUMBAR VERTEBRAL JOINTS WITH AUTOLOGOUS TISSUE SUBSTITUTE, POSTERIOR APPROACH, POSTERIOR COLUMN, OPEN APPROACH: ICD-10-PCS | Performed by: ORTHOPAEDIC SURGERY

## 2024-08-05 PROCEDURE — 72020 X-RAY EXAM OF SPINE 1 VIEW: CPT | Performed by: ORTHOPAEDIC SURGERY

## 2024-08-05 PROCEDURE — 0SB20ZZ EXCISION OF LUMBAR VERTEBRAL DISC, OPEN APPROACH: ICD-10-PCS | Performed by: ORTHOPAEDIC SURGERY

## 2024-08-05 PROCEDURE — 00QT0ZZ REPAIR SPINAL MENINGES, OPEN APPROACH: ICD-10-PCS | Performed by: ORTHOPAEDIC SURGERY

## 2024-08-05 PROCEDURE — 01NB0ZZ RELEASE LUMBAR NERVE, OPEN APPROACH: ICD-10-PCS | Performed by: ORTHOPAEDIC SURGERY

## 2024-08-05 DEVICE — COLLAGEN DURAL REGENERATION MEMBRANE 1IN X 3IN (2.5CM X 7.5CM)
Type: IMPLANTABLE DEVICE | Site: BACK | Status: FUNCTIONAL
Brand: DURAMATRIX-ONLAY PLUS

## 2024-08-05 DEVICE — DURASEAL® EXACT SPINAL SEALANT SYSTEM 5ML 5 PACK
Type: IMPLANTABLE DEVICE | Site: BACK | Status: FUNCTIONAL
Brand: DURASEAL EXACT SPINAL SEALANT SYSTEM

## 2024-08-05 RX ORDER — ACETAMINOPHEN 10 MG/ML
INJECTION, SOLUTION INTRAVENOUS
Status: COMPLETED
Start: 2024-08-05 | End: 2024-08-05

## 2024-08-05 RX ORDER — LABETALOL HYDROCHLORIDE 5 MG/ML
5 INJECTION, SOLUTION INTRAVENOUS EVERY 5 MIN PRN
Status: DISCONTINUED | OUTPATIENT
Start: 2024-08-05 | End: 2024-08-05 | Stop reason: HOSPADM

## 2024-08-05 RX ORDER — HYDROMORPHONE HYDROCHLORIDE 1 MG/ML
0.2 INJECTION, SOLUTION INTRAMUSCULAR; INTRAVENOUS; SUBCUTANEOUS EVERY 2 HOUR PRN
Status: DISCONTINUED | OUTPATIENT
Start: 2024-08-05 | End: 2024-08-08

## 2024-08-05 RX ORDER — LIDOCAINE HYDROCHLORIDE 10 MG/ML
INJECTION, SOLUTION EPIDURAL; INFILTRATION; INTRACAUDAL; PERINEURAL AS NEEDED
Status: DISCONTINUED | OUTPATIENT
Start: 2024-08-05 | End: 2024-08-05 | Stop reason: SURG

## 2024-08-05 RX ORDER — HYDROMORPHONE HYDROCHLORIDE 1 MG/ML
0.6 INJECTION, SOLUTION INTRAMUSCULAR; INTRAVENOUS; SUBCUTANEOUS EVERY 5 MIN PRN
Status: DISCONTINUED | OUTPATIENT
Start: 2024-08-05 | End: 2024-08-05 | Stop reason: HOSPADM

## 2024-08-05 RX ORDER — HYDROMORPHONE HYDROCHLORIDE 1 MG/ML
0.4 INJECTION, SOLUTION INTRAMUSCULAR; INTRAVENOUS; SUBCUTANEOUS EVERY 2 HOUR PRN
Status: DISCONTINUED | OUTPATIENT
Start: 2024-08-05 | End: 2024-08-08

## 2024-08-05 RX ORDER — SENNOSIDES 8.6 MG
17.2 TABLET ORAL NIGHTLY
Status: DISCONTINUED | OUTPATIENT
Start: 2024-08-05 | End: 2024-08-08

## 2024-08-05 RX ORDER — ONDANSETRON 2 MG/ML
4 INJECTION INTRAMUSCULAR; INTRAVENOUS ONCE
Status: COMPLETED | OUTPATIENT
Start: 2024-08-05 | End: 2024-08-05

## 2024-08-05 RX ORDER — NALOXONE HYDROCHLORIDE 0.4 MG/ML
80 INJECTION, SOLUTION INTRAMUSCULAR; INTRAVENOUS; SUBCUTANEOUS AS NEEDED
Status: DISCONTINUED | OUTPATIENT
Start: 2024-08-05 | End: 2024-08-05 | Stop reason: HOSPADM

## 2024-08-05 RX ORDER — ANASTROZOLE 1 MG/1
1 TABLET ORAL DAILY
Status: DISCONTINUED | OUTPATIENT
Start: 2024-08-06 | End: 2024-08-08

## 2024-08-05 RX ORDER — POLYETHYLENE GLYCOL 3350 17 G/17G
17 POWDER, FOR SOLUTION ORAL DAILY PRN
Status: DISCONTINUED | OUTPATIENT
Start: 2024-08-05 | End: 2024-08-08

## 2024-08-05 RX ORDER — HYDROMORPHONE HYDROCHLORIDE 1 MG/ML
0.4 INJECTION, SOLUTION INTRAMUSCULAR; INTRAVENOUS; SUBCUTANEOUS EVERY 5 MIN PRN
Status: DISCONTINUED | OUTPATIENT
Start: 2024-08-05 | End: 2024-08-05 | Stop reason: HOSPADM

## 2024-08-05 RX ORDER — LISINOPRIL AND HYDROCHLOROTHIAZIDE 20; 12.5 MG/1; MG/1
1 TABLET ORAL DAILY
Status: DISCONTINUED | OUTPATIENT
Start: 2024-08-05 | End: 2024-08-05 | Stop reason: SDUPTHER

## 2024-08-05 RX ORDER — ACETAMINOPHEN 10 MG/ML
1000 INJECTION, SOLUTION INTRAVENOUS EVERY 6 HOURS PRN
Status: DISCONTINUED | OUTPATIENT
Start: 2024-08-05 | End: 2024-08-05 | Stop reason: HOSPADM

## 2024-08-05 RX ORDER — ENEMA 19; 7 G/133ML; G/133ML
1 ENEMA RECTAL ONCE AS NEEDED
Status: DISCONTINUED | OUTPATIENT
Start: 2024-08-05 | End: 2024-08-08

## 2024-08-05 RX ORDER — METHOCARBAMOL 100 MG/ML
750 INJECTION, SOLUTION INTRAMUSCULAR; INTRAVENOUS ONCE
Status: COMPLETED | OUTPATIENT
Start: 2024-08-05 | End: 2024-08-05

## 2024-08-05 RX ORDER — ONDANSETRON 2 MG/ML
INJECTION INTRAMUSCULAR; INTRAVENOUS AS NEEDED
Status: DISCONTINUED | OUTPATIENT
Start: 2024-08-05 | End: 2024-08-05 | Stop reason: SURG

## 2024-08-05 RX ORDER — HYDROCODONE BITARTRATE AND ACETAMINOPHEN 10; 325 MG/1; MG/1
1 TABLET ORAL ONCE AS NEEDED
Status: DISCONTINUED | OUTPATIENT
Start: 2024-08-05 | End: 2024-08-05 | Stop reason: HOSPADM

## 2024-08-05 RX ORDER — LISINOPRIL 20 MG/1
20 TABLET ORAL DAILY
Status: DISCONTINUED | OUTPATIENT
Start: 2024-08-05 | End: 2024-08-08

## 2024-08-05 RX ORDER — DIPHENHYDRAMINE HCL 25 MG
25 CAPSULE ORAL EVERY 4 HOURS PRN
Status: DISCONTINUED | OUTPATIENT
Start: 2024-08-05 | End: 2024-08-08

## 2024-08-05 RX ORDER — METOCLOPRAMIDE HYDROCHLORIDE 5 MG/ML
10 INJECTION INTRAMUSCULAR; INTRAVENOUS EVERY 8 HOURS PRN
Status: DISCONTINUED | OUTPATIENT
Start: 2024-08-05 | End: 2024-08-08

## 2024-08-05 RX ORDER — DIPHENHYDRAMINE HYDROCHLORIDE 50 MG/ML
25 INJECTION INTRAMUSCULAR; INTRAVENOUS EVERY 4 HOURS PRN
Status: DISCONTINUED | OUTPATIENT
Start: 2024-08-05 | End: 2024-08-08

## 2024-08-05 RX ORDER — BISACODYL 10 MG
10 SUPPOSITORY, RECTAL RECTAL
Status: DISCONTINUED | OUTPATIENT
Start: 2024-08-05 | End: 2024-08-08

## 2024-08-05 RX ORDER — ACETAMINOPHEN 500 MG
1000 TABLET ORAL ONCE
Status: DISCONTINUED | OUTPATIENT
Start: 2024-08-05 | End: 2024-08-05 | Stop reason: HOSPADM

## 2024-08-05 RX ORDER — METHOCARBAMOL 500 MG/1
1 TABLET, FILM COATED ORAL 3 TIMES DAILY
COMMUNITY
Start: 2024-08-02

## 2024-08-05 RX ORDER — OXYCODONE HYDROCHLORIDE 5 MG/1
5 TABLET ORAL EVERY 4 HOURS PRN
Status: DISCONTINUED | OUTPATIENT
Start: 2024-08-05 | End: 2024-08-06

## 2024-08-05 RX ORDER — DIAZEPAM 5 MG/ML
5 INJECTION, SOLUTION INTRAMUSCULAR; INTRAVENOUS ONCE
Status: DISCONTINUED | OUTPATIENT
Start: 2024-08-05 | End: 2024-08-05 | Stop reason: HOSPADM

## 2024-08-05 RX ORDER — METOPROLOL TARTRATE 50 MG/1
50 TABLET, FILM COATED ORAL
Status: DISCONTINUED | OUTPATIENT
Start: 2024-08-05 | End: 2024-08-08

## 2024-08-05 RX ORDER — ANASTROZOLE 1 MG/1
1 TABLET ORAL DAILY
COMMUNITY
Start: 2023-12-07 | End: 2024-12-06

## 2024-08-05 RX ORDER — SODIUM CHLORIDE, SODIUM LACTATE, POTASSIUM CHLORIDE, CALCIUM CHLORIDE 600; 310; 30; 20 MG/100ML; MG/100ML; MG/100ML; MG/100ML
INJECTION, SOLUTION INTRAVENOUS CONTINUOUS
Status: DISCONTINUED | OUTPATIENT
Start: 2024-08-05 | End: 2024-08-05 | Stop reason: HOSPADM

## 2024-08-05 RX ORDER — HYDROCODONE BITARTRATE AND ACETAMINOPHEN 10; 325 MG/1; MG/1
1 TABLET ORAL EVERY 6 HOURS PRN
COMMUNITY
Start: 2024-08-02

## 2024-08-05 RX ORDER — TRANEXAMIC ACID 10 MG/ML
INJECTION, SOLUTION INTRAVENOUS AS NEEDED
Status: DISCONTINUED | OUTPATIENT
Start: 2024-08-05 | End: 2024-08-05 | Stop reason: SURG

## 2024-08-05 RX ORDER — ACETAMINOPHEN 500 MG
1000 TABLET ORAL ONCE AS NEEDED
Status: DISCONTINUED | OUTPATIENT
Start: 2024-08-05 | End: 2024-08-05 | Stop reason: HOSPADM

## 2024-08-05 RX ORDER — MEPERIDINE HYDROCHLORIDE 25 MG/ML
12.5 INJECTION INTRAMUSCULAR; INTRAVENOUS; SUBCUTANEOUS AS NEEDED
Status: DISCONTINUED | OUTPATIENT
Start: 2024-08-05 | End: 2024-08-05 | Stop reason: HOSPADM

## 2024-08-05 RX ORDER — HYDROMORPHONE HYDROCHLORIDE 1 MG/ML
0.2 INJECTION, SOLUTION INTRAMUSCULAR; INTRAVENOUS; SUBCUTANEOUS EVERY 5 MIN PRN
Status: DISCONTINUED | OUTPATIENT
Start: 2024-08-05 | End: 2024-08-05 | Stop reason: HOSPADM

## 2024-08-05 RX ORDER — DIAZEPAM 2 MG/1
2 TABLET ORAL EVERY 6 HOURS PRN
Status: DISCONTINUED | OUTPATIENT
Start: 2024-08-05 | End: 2024-08-08

## 2024-08-05 RX ORDER — LEVOTHYROXINE SODIUM 88 UG/1
88 TABLET ORAL
Status: DISCONTINUED | OUTPATIENT
Start: 2024-08-06 | End: 2024-08-08

## 2024-08-05 RX ORDER — DOCUSATE SODIUM 100 MG/1
100 CAPSULE, LIQUID FILLED ORAL 2 TIMES DAILY
Status: DISCONTINUED | OUTPATIENT
Start: 2024-08-05 | End: 2024-08-08

## 2024-08-05 RX ORDER — CELECOXIB 200 MG/1
200 CAPSULE ORAL ONCE
Status: COMPLETED | OUTPATIENT
Start: 2024-08-05 | End: 2024-08-05

## 2024-08-05 RX ORDER — METHOCARBAMOL 100 MG/ML
INJECTION, SOLUTION INTRAMUSCULAR; INTRAVENOUS
Status: COMPLETED
Start: 2024-08-05 | End: 2024-08-05

## 2024-08-05 RX ORDER — ROCURONIUM BROMIDE 10 MG/ML
INJECTION, SOLUTION INTRAVENOUS AS NEEDED
Status: DISCONTINUED | OUTPATIENT
Start: 2024-08-05 | End: 2024-08-05 | Stop reason: SURG

## 2024-08-05 RX ORDER — VANCOMYCIN HYDROCHLORIDE 1 G/20ML
INJECTION, POWDER, LYOPHILIZED, FOR SOLUTION INTRAVENOUS AS NEEDED
Status: DISCONTINUED | OUTPATIENT
Start: 2024-08-05 | End: 2024-08-05 | Stop reason: HOSPADM

## 2024-08-05 RX ORDER — ONDANSETRON 2 MG/ML
4 INJECTION INTRAMUSCULAR; INTRAVENOUS EVERY 6 HOURS PRN
Status: DISCONTINUED | OUTPATIENT
Start: 2024-08-05 | End: 2024-08-08

## 2024-08-05 RX ORDER — CELECOXIB 200 MG/1
CAPSULE ORAL
Status: COMPLETED
Start: 2024-08-05 | End: 2024-08-05

## 2024-08-05 RX ORDER — MIDAZOLAM HYDROCHLORIDE 1 MG/ML
1 INJECTION INTRAMUSCULAR; INTRAVENOUS EVERY 5 MIN PRN
Status: DISCONTINUED | OUTPATIENT
Start: 2024-08-05 | End: 2024-08-05 | Stop reason: HOSPADM

## 2024-08-05 RX ORDER — ONDANSETRON 2 MG/ML
4 INJECTION INTRAMUSCULAR; INTRAVENOUS EVERY 6 HOURS PRN
Status: DISCONTINUED | OUTPATIENT
Start: 2024-08-05 | End: 2024-08-05 | Stop reason: HOSPADM

## 2024-08-05 RX ORDER — SODIUM CHLORIDE, SODIUM LACTATE, POTASSIUM CHLORIDE, CALCIUM CHLORIDE 600; 310; 30; 20 MG/100ML; MG/100ML; MG/100ML; MG/100ML
INJECTION, SOLUTION INTRAVENOUS CONTINUOUS
Status: DISCONTINUED | OUTPATIENT
Start: 2024-08-05 | End: 2024-08-08

## 2024-08-05 RX ORDER — METOCLOPRAMIDE HYDROCHLORIDE 5 MG/ML
10 INJECTION INTRAMUSCULAR; INTRAVENOUS EVERY 8 HOURS PRN
Status: DISCONTINUED | OUTPATIENT
Start: 2024-08-05 | End: 2024-08-05 | Stop reason: HOSPADM

## 2024-08-05 RX ORDER — HYDROCODONE BITARTRATE AND ACETAMINOPHEN 10; 325 MG/1; MG/1
2 TABLET ORAL ONCE AS NEEDED
Status: DISCONTINUED | OUTPATIENT
Start: 2024-08-05 | End: 2024-08-05 | Stop reason: HOSPADM

## 2024-08-05 RX ORDER — DEXAMETHASONE SODIUM PHOSPHATE 4 MG/ML
VIAL (ML) INJECTION AS NEEDED
Status: DISCONTINUED | OUTPATIENT
Start: 2024-08-05 | End: 2024-08-05 | Stop reason: SURG

## 2024-08-05 RX ADMIN — SODIUM CHLORIDE, SODIUM LACTATE, POTASSIUM CHLORIDE, CALCIUM CHLORIDE: 600; 310; 30; 20 INJECTION, SOLUTION INTRAVENOUS at 15:43:00

## 2024-08-05 RX ADMIN — ROCURONIUM BROMIDE 10 MG: 10 INJECTION, SOLUTION INTRAVENOUS at 14:23:00

## 2024-08-05 RX ADMIN — LIDOCAINE HYDROCHLORIDE 50 MG: 10 INJECTION, SOLUTION EPIDURAL; INFILTRATION; INTRACAUDAL; PERINEURAL at 12:19:00

## 2024-08-05 RX ADMIN — ROCURONIUM BROMIDE 30 MG: 10 INJECTION, SOLUTION INTRAVENOUS at 12:25:00

## 2024-08-05 RX ADMIN — DEXAMETHASONE SODIUM PHOSPHATE 8 MG: 4 MG/ML VIAL (ML) INJECTION at 12:19:00

## 2024-08-05 RX ADMIN — TRANEXAMIC ACID 1000 MG: 10 INJECTION, SOLUTION INTRAVENOUS at 12:31:00

## 2024-08-05 RX ADMIN — ONDANSETRON 4 MG: 2 INJECTION INTRAMUSCULAR; INTRAVENOUS at 14:51:00

## 2024-08-05 NOTE — H&P
Patient was seen today, 8/5/24, and wishes to proceed with surgical intervention. No changes from previous visit.   Agree with below:   84 year old female here for pre-op lumbar decompression, discectomy, fusion 8/7/24. Pre-op requested by Dr. Moreno. No cp, sob. No symptoms with climbing 2 flights of stairs.    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  Colon adenoma 2011  Compression of lumbar nerve root 2009  Diaphragmatic hernia without mention of obstruction or gangrene  Esophageal reflux 2008  High blood pressure  Hypothyroidism  Mixed hyperlipidemia  Obesity (BMI 30-39.9)  Osteoarthrosis, unspecified whether generalized or localized, unspecified site  Unspecified vitamin D deficiency    Past Surgical History:  Procedure Laterality Date  COLONOSCOPY,DIAGNOSTIC 2016  normal  EXCISIONAL BIOSPY RIGHT 1995  INGUINAL HERNIA REPAIR 1985  KNEE ARTHROSCOPY Bilateral  LUMPECTOMY LEFT Left 2023  RADIATION LEFT Left 12/2023  REPAIR ROTATOR CUFF,ACUTE Right 2014  TONSILLECTOMY  TOTAL ABDOM HYSTERECTOMY 1985    Family History  Problem Relation Age of Onset  Lipids Father  Hypertension Father  Heart Disorder Father  Breast Cancer Sister 46  Thyroid disease Sister  Thyroid disease Daughter  Thyroid disease Daughter  Thyroid disease Daughter  Breast Cancer Cousin 48  Breast Cancer Maternal Aunt 60    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, retired realtor, 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, occ etoh, no drugs    Current Outpatient Medications:  apixaban 5 MG  Oral Tab, Take 1 tablet (5 mg total) by mouth 2 (two) times daily., Disp: 180 tablet, Rfl: 3  lisinopril 20 MG Oral Tab, Take 1 tablet (20 mg total) by mouth daily., Disp: 90 tablet, Rfl: 3  lisinopril-hydroCHLOROthiazide 20-12.5 MG Oral Tab, Take 1 tablet by mouth daily., Disp: 90 tablet, Rfl: 3  metoprolol tartrate 50 MG Oral Tab, Take 1 tablet (50 mg total) by mouth 2 (two) times daily., Disp: 180 tablet, Rfl: 3  Mometasone Furoate 0.1 % External Cream, Apply 1 Application topically 2 (two) times daily as needed., Disp: 60 g, Rfl: 3  anastrozole 1 MG Oral Tab tab, Take 1 tablet (1 mg total) by mouth daily., Disp: 90 tablet, Rfl: 3  levothyroxine 88 MCG Oral Tab, Take 1 tablet (88 mcg total) by mouth daily., Disp: 90 tablet, Rfl: 3  aspirin (ECOTRIN LOW STRENGTH) 81 MG Oral Tab EC, Take 1 tablet (81 mg total) by mouth daily., Disp: , Rfl: 0  Multiple Vitamin (TAB-A-AGUEDA) Oral Tab, Take 1 tablet by mouth daily., Disp: , Rfl:  CALCIUM 600 + D OR, Take 1 tablet by mouth daily. , Disp: , Rfl:  Meloxicam 15 MG Oral Tab, Take 1 tablet (15 mg total) by mouth daily. (Patient not taking: Reported on 2/29/2024), Disp: 30 tablet, Rfl: 1    /75 (BP Location: Left arm, Patient Position: Sitting, Cuff Size: adult)  Pulse 80  Temp 97.9 °F (36.6 °C) (Temporal)  Ht 5' 4\" (1.626 m)  Wt 226 lb (102.5 kg)  SpO2 97%  BMI 38.79 kg/m²  GENERAL: well developed, well nourished, in no apparent distress  SKIN: no rashes, no suspicious lesions  EYES: PERRLA, EOMI, sclera anicteric, conjunctiva normal; there is no nystagmus  HEENT: normocephalic; normal nose, pharynx and TM's  NECK: supple; FROM; no JVD, no TMG, no carotid bruits  RESPIRATORY: clear to percussion and auscultation  CARDIOVASCULAR: S1, S2 normal, RRR; no S3, no S4; no click; murmur negative  ABDOMEN: normal active BS+, soft, nondistended; no HSM; no masses; no bruits; nontender  LYMPHATIC: no lymphadenopathy  MUSCULOSKELETAL: no acute synovitis upper or lower  extremity  EXTREMITIES: no cyanosis, clubbing or edema, peripheral pulses intact  NEUROLOGIC: intact; no sensorimotor deficit; reflexes normal  PSYCHIATRIC: alert and oriented x 3; affect appropriate    12 lead nsr, no st twave changes, no sig qwaves    A/P Pre-op lumbar decompression, discectomy, fusion: acceptable risk for surgery  -check cbc, comp, pt/ptt, u/a with reflex  -check cxr, mrsa/mssa  -blood thinners to be managed by cardiology  Electronically signed by Shauna Baez MD at 07/11/2024 9:22 AM CDT   Plan of Treatment  - documented as of this encounter  Plan of Treatment - Upcoming Encounters  Upcoming Encounters  Date Type Department Care Team (Latest Contact Info) Description   08/16/2024 10:30 AM CDT Office Visit Spine - [address] [facility] [place] [address]   [address]   [place] [address]   [phone]  Alina Todd PA-C   [address] [facility]   [address]   [place], [place] [id number] [phone] (Work)   [phone] (Fax)      09/17/2024 8:30 AM CDT Office Visit Spine [address] [place] [address]   [address]   [place], [place] [address] [phone]  JEWELS Moreno MD   [facility] [address]   [address]   [place] [address]   [phone] (Work)   [phone] (Fax)      09/25/2024 2:30 PM CDT Office Visit Dermatology - [facility] [address]   [address]   [place], [place] [address] [phone]  Jose Sorto PA   [address] [facility] [address] [address]   [address]   [place] [address]   [phone] (Work)   [phone] (Fax)      11/01/2024 9:30 AM CDT Office Visit Surgery - [address] [place] [address]   [address]   [place], [place] [address] [phone]  Ric Santacruz MD   [address]   [address]   [place], [place] [address] [phone] (Work)   [phone] (Fax)      11/21/2024 10:00 AM CST Office Visit Oncology - [address]   [address]   [address]   [place], [place] [address] [phone]  Maki Sawant NP   [address]   [address]   [place]  [address]   [phone] (Work)   [phone] (Fax)      12/03/2024 1:00 PM CST Office Visit Endocrinology - [facility] [place]   [address]   [address]   [place] [address]   [phone]  Fidelina Bond MD   [address]   [address]   [place] [address]   [phone] (Work)   [phone] (Fax)      12/17/2024 11:20 AM CST Office Visit Cardiology - [address] [place]   [address]   [address]   [place] [id number] [phone]  Jacob Romero MD   [address]   [address]   [place] [address]   [phone] (Work)   [phone] (Fax)      02/20/2025 10:00 AM CST Office Visit Radiation Oncology - [facility]   [address] [facility]   [address]   [place], [place] [address] [phone]  Juanita Welch MD   [address]   [place], [place] [address] [phone] (Work)   [phone] (Fax)        Plan of Treatment - Scheduled Orders  Scheduled Orders  Name Type Priority Associated Diagnoses Order Schedule   OFFICE/OUTPT VISIT,EST,LEVL IV PROCEDURES Routine Preop exam for internal medicine  Ordered: 07/10/2024     Procedures  - documented in this encounter  Procedures  Procedure Name Priority Date/Time Associated Diagnosis Comments   ELECTROCARDIOGRAM, COMPLETE Routine 07/12/2024 6:34 AM CDT Preop exam for internal medicine      MRSA / MSSA PRE-OP Routine 07/10/2024 12:44 PM CDT Preop exam for internal medicine  Results for this procedure are in the results section.      Lab Results  - documented in this encounter  Table of Contents for Lab Results   (ABNORMAL) URINALYSIS, COMPLETE WITH MICROSCOPIC EXAMINATION WITH REFLEX TO URINE CULTURE, ROUTINE (07/11/2024 9:40 AM CDT)   PROTHROMBIN TIME (PT) AND PARTIAL THROMBOPLASTIN TIME (PTT) (07/11/2024 9:19 AM CDT)   (ABNORMAL) COMPREHENSIVE METABOLIC PANEL (07/11/2024 9:19 AM CDT)   CBC WITH DIFFERENTIAL WITH PLATELET (07/11/2024 9:19 AM CDT)   MRSA / MSSA PRE-OP (07/10/2024 12:44 PM CDT)      (ABNORMAL) URINALYSIS, COMPLETE WITH  MICROSCOPIC EXAMINATION WITH REFLEX TO URINE CULTURE, ROUTINE (07/11/2024 9:40 AM CDT)  Lab Results - (ABNORMAL) URINALYSIS, COMPLETE WITH MICROSCOPIC EXAMINATION WITH REFLEX TO URINE CULTURE, ROUTINE (07/11/2024 9:40 AM CDT)  Component Value Ref Range Test Method Analysis Time Performed At Pathologist Signature   Color YELLOW YELLOW   07/12/2024 4:54 AM CDT [facility]     Appearance CLEAR CLEAR   07/12/2024 4:54 AM CDT [facility]     Specific Gravity 1.018 1.001 - 1.035   07/12/2024 4:54 AM CDT [facility]     Ph 5.5 5.0 - 8.0   07/12/2024 4:54 AM CDT [facility]     Glucose NEGATIVE NEGATIVE   07/12/2024 4:54 AM CDT [facility]     Bilirubin NEGATIVE NEGATIVE   07/12/2024 4:54 AM CDT [facility]     Ketones NEGATIVE NEGATIVE   07/12/2024 4:54 AM CDT [facility]     Occult Blood NEGATIVE NEGATIVE   07/12/2024 4:54 AM CDT [facility]     Protein NEGATIVE NEGATIVE   07/12/2024 4:54 AM CDT [facility]     Nitrite NEGATIVE NEGATIVE   07/12/2024 4:54 AM CDT [facility]     Leukocyte Esterase 1+ (A) NEGATIVE   07/12/2024 4:54 AM CDT [facility]     Wbc 6-10 (A) < OR = 5 /HPF   07/12/2024 4:54 AM CDT [facility]     Rbc 0-2 < OR = 2 /HPF   07/12/2024 4:54 AM CDT [facility]     Squamous Epithelial Cells 0-5 < OR = 5 /HPF   07/12/2024 4:54 AM CDT [facility]     Bacteria NONE SEEN NONE SEEN /HPF   07/12/2024 4:54 AM CDT [facility]     Hyaline Cast NONE SEEN NONE SEEN /LPF   07/12/2024 4:54 AM CDT [facility]     Note SEE COMMENT     07/12/2024 4:54 AM CDT [facility]     Comment:  This urine was analyzed for the presence of WBC,  RBC, bacteria, casts, and other formed elements.  Only those elements seen were reported.         Lab Results - (ABNORMAL) URINALYSIS,  COMPLETE WITH MICROSCOPIC EXAMINATION WITH REFLEX TO URINE CULTURE, ROUTINE (07/11/2024 9:40 AM CDT)  Specimen (Source) Anatomical Location / Laterality Collection Method / Volume Collection Time Received Time   Urine URINE SPECIMEN OBTAINED BY CLEAN CATCH PROCEDURE / Unknown   07/11/2024 9:40 AM CDT 07/11/2024 9:40 AM CDT     Lab Results - (ABNORMAL) URINALYSIS, COMPLETE WITH MICROSCOPIC EXAMINATION WITH REFLEX TO URINE CULTURE, ROUTINE (07/11/2024 9:40 AM CDT)  Narrative   [facility] - 07/12/2024 4:54 AM CDT   Performing Organization Information:            [facility] [address]      Galo Najera      Lab Results - (ABNORMAL) URINALYSIS, COMPLETE WITH MICROSCOPIC EXAMINATION WITH REFLEX TO URINE CULTURE, ROUTINE (07/11/2024 9:40 AM CDT)  Authorizing Provider Result Type   Shauna Baez MD URINE ORDERABLES     Lab Results - (ABNORMAL) URINALYSIS, COMPLETE WITH MICROSCOPIC EXAMINATION WITH REFLEX TO URINE CULTURE, ROUTINE (07/11/2024 9:40 AM CDT)  Performing Organization Address City/State/ZIP Code Phone Number   [facility]             Back to top of Lab Results       PROTHROMBIN TIME (PT) AND PARTIAL THROMBOPLASTIN TIME (PTT) (07/11/2024 9:19 AM CDT)  Lab Results - PROTHROMBIN TIME (PT) AND PARTIAL THROMBOPLASTIN TIME (PTT) (07/11/2024 9:19 AM CDT)  Specimen (Source) Anatomical Location / Laterality Collection Method / Volume Collection Time Received Time   Blood     07/11/2024 9:19 AM CDT       Lab Results - PROTHROMBIN TIME (PT) AND PARTIAL THROMBOPLASTIN TIME (PTT) (07/11/2024 9:19 AM CDT)  Narrative   [facility] - 07/11/2024 10:01 AM CDT   The following orders were created for panel order PROTHROMBIN TIME (PT) AND PARTIAL THROMBOPLASTIN TIME (PTT).  Procedure                               Abnormality         Status                    ---------                               -----------         ------                     PROTHROMBIN TIME (PT)[[id number]]        Normal              Final result              PARTIAL THROMBOPLASTIN T...[[id number]]  Normal              Final result                Please view results for these tests on the individual orders.      Lab Results - PROTHROMBIN TIME (PT) AND PARTIAL THROMBOPLASTIN TIME (PTT) (07/11/2024 9:19 AM CDT)  Authorizing Provider Result Type   Shauna Baez MD LAB BLOOD ORDERABLES     Lab Results - PROTHROMBIN TIME (PT) AND PARTIAL THROMBOPLASTIN TIME (PTT) (07/11/2024 9:19 AM CDT)  Performing Organization Address City/State/ZIP Code Phone Number   [facility]  [address]   [address] [place] [address]  [phone]       Back to top of Lab Results       (ABNORMAL) COMPREHENSIVE METABOLIC PANEL (07/11/2024 9:19 AM CDT)  Lab Results - (ABNORMAL) COMPREHENSIVE METABOLIC PANEL (07/11/2024 9:19 AM CDT)  Component Value Ref Range Test Method Analysis Time Performed At Pathologist Signature   Patient Fasting? Yes     07/11/2024 10:03 AM CDT [facility]     Glucose 89 74 - 109 mg/dL   07/11/2024 10:03 AM CDT [facility]     Blood Urea Nitrogen 23.0 (H) 6.0 - 20.0 mg/dL   07/11/2024 10:03 AM CDT [facility]     Creatinine 0.85 0.5 - 0.9 mg/dL   07/11/2024 10:03 AM CDT [facility]     BUN/CREAT Ratio 27.0 (H) 10.0 - 20.0   07/11/2024 10:03 AM CDT [facility]     Sodium 143 136 - 145 mmol/L   07/11/2024 10:03 AM CDT [facility]     Potassium 4.3 3.5 - 5.2 mmol/L   07/11/2024 10:03 AM CDT [facility]     Chloride 103 98 - 107 mmol/L   07/11/2024 10:03 AM CDT [facility]     Carbon Dioxide 30.1 (H) 22.0 - 29.0 mmol/L   07/11/2024 10:03 AM CDT [facility]     Calcium 10.1 8.6 - 10.3 mg/dL   07/11/2024 10:03 AM CDT [facility]     Total Protein 7.2 6.4 - 8.3 g/dL   07/11/2024 10:03 AM T [facility]     Albumin 4.3 3.5 - 5.2 g/dL   07/11/2024 10:03 AM T  [facility]     Bilirubin, Total 0.53 0.00 - 1.20 mg/dL   07/11/2024 10:03 AM CDT [facility]     Alkaline Phosphatase 104 55 - 142 U/L   07/11/2024 10:03 AM CDT [facility]     AST 25 0 - 32 U/L   07/11/2024 10:03 AM CDT [facility]     ALT 14 0 - 33 U/L   07/11/2024 10:03 AM CDT [facility]     GFR CKD-EPI 64.01 >=60.00 mL/min/1.73 m²   07/11/2024 10:03 AM CDT [facility]     Comment:  Estimated GFR units: mL/min/1.73 square meters  eGFR calculated by the CKD-EPI equation.     Lab Results - (ABNORMAL) COMPREHENSIVE METABOLIC PANEL (07/11/2024 9:19 AM CDT)  Specimen (Source) Anatomical Location / Laterality Collection Method / Volume Collection Time Received Time   Blood   Venipuncture / Unknown 07/11/2024 9:19 AM CDT 07/11/2024 9:19 AM CDT     Lab Results - (ABNORMAL) COMPREHENSIVE METABOLIC PANEL (07/11/2024 9:19 AM CDT)  Narrative         Lab Results - (ABNORMAL) COMPREHENSIVE METABOLIC PANEL (07/11/2024 9:19 AM CDT)  Authorizing Provider Result Type   Shauna Baez MD LAB BLOOD ORDERABLES     Lab Results - (ABNORMAL) COMPREHENSIVE METABOLIC PANEL (07/11/2024 9:19 AM CDT)  Performing Organization Address City/State/ZIP Code Phone Number   [facility]  [address]   [address] [place] [address]  [phone]       Back to top of Lab Results       CBC WITH DIFFERENTIAL WITH PLATELET (07/11/2024 9:19 AM CDT)  Lab Results - CBC WITH DIFFERENTIAL WITH PLATELET (07/11/2024 9:19 AM CDT)  Specimen (Source) Anatomical Location / Laterality Collection Method / Volume Collection Time Received Time   Blood     07/11/2024 9:19 AM CDT       Lab Results - CBC WITH DIFFERENTIAL WITH PLATELET (07/11/2024 9:19 AM CDT)  Narrative   [facility] - 07/11/2024 9:43 AM CDT   The following orders were created for panel order CBC WITH DIFFERENTIAL WITH PLATELET.  Procedure                               Abnormality         Status                     ---------                               -----------         ------                    COMPLETE BLOOD COUNT (CB...[[id number]]                      Final result                Please view results for these tests on the individual orders.      Lab Results - CBC WITH DIFFERENTIAL WITH PLATELET (07/11/2024 9:19 AM CDT)  Authorizing Provider Result Type   Shauna Baez MD LAB BLOOD ORDERABLES     Lab Results - CBC WITH DIFFERENTIAL WITH PLATELET (07/11/2024 9:19 AM CDT)  Performing Organization Address City/State/ZIP Code Phone Number   [facility] [employer]  [address]   [address]  [phone]       Back to top of Lab Results       MRSA / MSSA PRE-OP (07/10/2024 12:44 PM CDT)  Lab Results - MRSA / MSSA PRE-OP (07/10/2024 12:44 PM CDT)  Component Value Ref Range Test Method Analysis Time Performed At Pathologist Signature   MRSA by PCR NEGATIVE Negative   07/10/2024 8:40 PM CDT [facility]     Staph Aureus Screen By PCR NEGATIVE Negative   07/10/2024 8:40 PM CDT [facility]       Lab Results - MRSA / MSSA PRE-OP (07/10/2024 12:44 PM CDT)  Specimen (Source) Anatomical Location / Laterality Collection Method / Volume Collection Time Received Time     SPECIMEN FROM INTERNAL NOSE / Unknown   07/10/2024 12:44 PM CDT 07/10/2024 12:44 PM CDT     Lab Results - MRSA / MSSA PRE-OP (07/10/2024 12:44 PM CDT)  Narrative         Lab Results - MRSA / MSSA PRE-OP (07/10/2024 12:44 PM CDT)  Authorizing Provider Result Type   Shauna Baez MD MICROBIOLOGY ORDERABLES     Lab Results - MRSA / MSSA PRE-OP (07/10/2024 12:44 PM CDT)  Performing Organization Address City/[facility]/ZIP Code Phone Number   [facility] [employer]  [address]  [phone]       Back to top of Lab Results  Imaging Results  - documented in this encounter  XR CHEST PA + LAT CHEST (CPT=71046) (07/11/2024 9:16 AM CDT)  Imaging Results - XR CHEST PA + LAT CHEST (CPT=71046) (07/11/2024 9:16  AM CDT)  Anatomical Region Laterality Modality   Chest   Computed Radiography     Imaging Results - XR CHEST PA + LAT CHEST (CPT=71046) (07/11/2024 9:16 AM CDT)  Specimen (Source) Anatomical Location / Laterality Collection Method / Volume Collection Time Received Time         07/11/2024 1:14 PM CDT       Imaging Results - XR CHEST PA + LAT CHEST (CPT=71046) (07/11/2024 9:16 AM CDT)  Impressions   07/11/2024 1:15 PM CDT   IMPRESSION:    No acute cardiopulmonary process.      Imaging Results - XR CHEST PA + LAT CHEST (CPT=71046) (07/11/2024 9:16 AM CDT)  Narrative   07/11/2024 1:15 PM CDT   DATE OF SERVICE: 07.11.2024  PROCEDURE: XR CHEST PA + LAT CHEST (SBJ=06537)    CLINICAL INFORMATION: Preop exam for internal medicine    COMPARISON: 6/4/16    TECHNIQUE: PA and lateral views of the chest, 2 views.    FINDINGS:  The lungs are clear. The heart and pulmonary vasculature appear normal. No pneumothorax or pleural  effusion is seen. Degenerative changes are seen throughout the thoracic spine. Atherosclerosis of  the thoracic aorta is noted.        Imaging Results - XR CHEST PA + LAT CHEST (CPT=71046) (07/11/2024 9:16 AM CDT)  Procedure Note   Desean Wagner MD - 07/11/2024   Formatting of this note might be different from the original.  DATE OF SERVICE: 07.11.2024  PROCEDURE: XR CHEST PA + LAT CHEST (ERW=29803)    CLINICAL INFORMATION: Preop exam for internal medicine    COMPARISON: 6/4/16    TECHNIQUE: PA and lateral views of the chest, 2 views.    FINDINGS:  The lungs are clear. The heart and pulmonary vasculature appear normal. No pneumothorax or pleural  effusion is seen. Degenerative changes are seen throughout the thoracic spine. Atherosclerosis of  the thoracic aorta is noted.    =====  IMPRESSION:  No acute cardiopulmonary process.     Imaging Results - XR CHEST PA + LAT CHEST (CPT=71046) (07/11/2024 9:16 AM CDT)  Authorizing Provider Result Type   Shauna LUCIANOAY

## 2024-08-05 NOTE — PLAN OF CARE
POD 0 lumbar fusion / decompression w/ incidental dural tear, Pt is AAOX4, VSS, very [place] w/ HA's, post-op anguiano in place, Duvol drain to gravity, flat bed rest until cleared by spine, PT / OT eval pending, pain control, Pt doing well, all needs met, all safety measures in place, call light within reach, will CTM.

## 2024-08-05 NOTE — BRIEF OP NOTE
Pre-Operative Diagnosis: L2-L5 STENOSIS     Post-Operative Diagnosis: L2-L5 STENOSIS      Procedure Performed:   LUMBAR 2 -LUMBAR 3, LUMBAR 3 - LUMBAR 4, LUMBAR 4 - LUMBAR 5 DECOMPRESSION, LUMBAR 2 - LUMBAR 3 DISCECTOMY, LUMBAR 2 - LUMBAR 3, LUMBAR 3 - LUMBAR 4, LUMBAR 4 - LUMBAR 5 UNINSTRUMENTED FUSION, AND REPAIR OF INCIDENTAL DUROTOMY    Surgeons and Role:     * JEWELS Moreno MD - Primary    Assistant(s):  PA: Alina Todd PA-C     Surgical Findings: Above     Specimen: none    Complications: lumbar durotomy and repair     Estimated Blood Loss: Blood Output: 250 mL (8/5/2024  2:51 PM)      Dictation Number:      Alina Todd PA-C  8/5/2024  3:30 PM

## 2024-08-05 NOTE — ANESTHESIA POSTPROCEDURE EVALUATION
[facility]    Maritza Cantu Patient Status:  Inpatient   Age/Gender 84 year old female MRN [id number]   Location [facility] SURGERY Attending JEWELS Moreno MD   Hosp Day # 0 PCP Shauna Baez MD       Anesthesia Post-op Note    LUMBAR 2 -LUMBAR 3, LUMBAR 3 - LUMBAR 4, LUMBAR 4 - LUMBAR 5 DECOMPRESSION, LUMBAR 2 - LUMBAR 3 DISCECTOMY, LUMBAR 2 - LUMBAR 3, LUMBAR 3 - LUMBAR 4, LUMBAR 4 - LUMBAR 5 UNINSTRUMENTED FUSION, AND REPAIR OF INCIDENTAL DUROTOMY    Procedure Summary       Date: 08/05/24 Room / Location:  MAIN OR 11 / [facility] MAIN OR    Anesthesia Start: 1212 Anesthesia Stop: 1543    Procedure: LUMBAR 2 -LUMBAR 3, LUMBAR 3 - LUMBAR 4, LUMBAR 4 - LUMBAR 5 DECOMPRESSION, LUMBAR 2 - LUMBAR 3 DISCECTOMY, LUMBAR 2 - LUMBAR 3, LUMBAR 3 - LUMBAR 4, LUMBAR 4 - LUMBAR 5 UNINSTRUMENTED FUSION, AND REPAIR OF INCIDENTAL DUROTOMY (Spine Lumbar) Diagnosis: (L2-L5 STENOSIS)    Surgeons: JEWELS Moreno MD Anesthesiologist: Isaiah Johnson MD    Anesthesia Type: general ASA Status: 3            Anesthesia Type: general    Vitals Value Taken Time   /95 08/05/24 1543   Temp 96.2 °F (35.7 °C) 08/05/24 1543   Pulse 65 08/05/24 1543   Resp 16 08/05/24 1543   SpO2 97 % 08/05/24 1543       Patient Location: PACU    Anesthesia Type: general    Airway Patency: patent and extubated    Postop Pain Control: adequate    Mental Status: mildly sedated but able to meaningfully participate in the post-anesthesia evaluation    Nausea/Vomiting: none    Cardiopulmonary/Hydration status: stable euvolemic    Complications: no apparent anesthesia related complications    Postop vital signs: stable    Dental Exam: Unchanged from Preop    Patient to be discharged from PACU when criteria met.

## 2024-08-05 NOTE — ANESTHESIA PREPROCEDURE EVALUATION
PRE-OP EVALUATION    Patient Name: Maritza Cantu    Admit Diagnosis: L2-L5 STENOSIS    Pre-op Diagnosis: L2-L5 STENOSIS    LUMBAR 2 -LUMBAR 3, LUMBAR 3 - LUMBAR 4, LUMBAR 4 - LUMBAR 5 DECOMPRESSION, LUMBAR 2 - LUMBAR 3 DISCECTOMY, LUMBAR 2 - LUMBAR 3, LUMBAR 3 - LUMBAR 4, LUMBAR 5 - LUMBAR 5 UNINSTRUMENTED FUSION    Anesthesia Procedure: LUMBAR 2 -LUMBAR 3, LUMBAR 3 - LUMBAR 4, LUMBAR 4 - LUMBAR 5 DECOMPRESSION, LUMBAR 2 - LUMBAR 3 DISCECTOMY, LUMBAR 2 - LUMBAR 3, LUMBAR 3 - LUMBAR 4, LUMBAR 5 - LUMBAR 5 UNINSTRUMENTED FUSION (Spine Lumbar)    Surgeons and Role:     * JEWELS Moreno MD - Primary    Pre-op vitals reviewed.  Temp: 97.9 °F (36.6 °C)  Pulse: 68  Resp: 16  BP: 147/68  SpO2: 97 %  Body mass index is 38.62 kg/m².    Current medications reviewed.  Hospital Medications:   acetaminophen (Tylenol Extra Strength) tab 1,000 mg  1,000 mg Oral Once    lactated ringers infusion   Intravenous Continuous    [COMPLETED] ondansetron (Zofran) 4 MG/2ML injection 4 mg  4 mg Intravenous Once    ceFAZolin (Ancef) 2g in 10mL IV syringe premix  2 g Intravenous Once    [COMPLETED] celecoxib (CeleBREX) cap 200 mg  200 mg Oral Once    clonidine-EPINEPHrine-ropivacaine-ketorolac pain cocktail syringe (OR)   Injection Once (Intra-Op)       Outpatient Medications:     Medications Prior to Admission   Medication Sig Dispense Refill Last Dose    HYDROcodone-acetaminophen  MG Oral Tab Take 1 tablet by mouth every 6 (six) hours as needed.   post op    anastrozole 1 MG Oral Tab tab Take 1 tablet (1 mg total) by mouth daily.   8/5/2024 at 0830    apixaban 5 MG Oral Tab Take 1 tablet (5 mg total) by mouth 2 (two) times daily.   7/31/2024    methocarbamol 500 MG Oral Tab Take 1 tablet (500 mg total) by mouth 3 (three) times daily.   post op    levothyroxine 88 MCG Oral Tab Take 1 tablet (88 mcg total) by mouth before breakfast.   8/5/2024 at 0830    aspirin 81 MG Oral Tab EC Take 1 tablet (81 mg total) by mouth daily.   7/28/2024     Lisinopril-hydroCHLOROthiazide 20-12.5 MG Oral Tab Take 1 tablet by mouth daily. 90 tablet 3 8/4/2024 at 0800    lisinopril 20 MG Oral Tab Take 1 tablet (20 mg total) by mouth daily. 90 tablet 3 8/4/2024 at 1900    metoprolol tartrate 50 MG Oral Tab Take 1 tablet (50 mg total) by mouth 2 (two) times daily. 180 tablet 3 8/5/2024 at 0830    Multiple Vitamin (TAB-A-AGUEDA) Oral Tab Take 1 tablet by mouth daily.   7/28/2024    CALCIUM 600 + D OR Take 1 tablet by mouth daily.     8/4/2024 at 0800       Allergies: Erythromycin base, Talwin [pentazocine lactate], Levofloxacin [quixin], Daypro [oxaprozin], and Tetracycline base      Anesthesia Evaluation    Patient summary reviewed.    Anesthetic Complications  (-) history of anesthetic complications         GI/Hepatic/Renal      (+) GERD                           Cardiovascular                (+) obesity  (+) hypertension   (+) hyperlipidemia                                  Endo/Other                           (+) arthritis       Pulmonary                           Neuro/Psych                 (+) neuromuscular disease             Acquired trigger finger of right index finger Degenerative lumbar spinal stenosis  Essential hypertension Hypothyroidism  Left total knee arthroplasty  Global 05/25/2020 Mixed hyperlipidemia  Morbid obesity with BMI of 40.0-44.9, adult (HCC) Olecranon bursitis of right elbow  Primary osteoarthritis of left knee Primary osteoarthritis of right knee  Vitamin D deficiency             Past Surgical History:   Procedure Laterality Date    Arthroscopy of joint unlisted Right     knee    Colonoscopy  9/2003    normal    Colonoscopy  10/6/11 - [facility]    adenoma, hemorrhoids. repeat 2021.    Colonoscopy,diagnostic  11/17/16    normal    Excisional biospy right  1995    benign    Hernia surgery  1985    inguinal    Hysterectomy  1985    with BSO    Repair rotator cuff,acute Right 6/2014    Tonsillectomy      removed in childhood around age 12    Upper gi  endoscopy - referral  7/9/12 - [facility]    small gastric erosion, no etiology for bleeding identifiedesophageal and gastric bx negative    Upper gi endoscopy,diagnosis  9/2003    hiatal hernia     Social History     Socioeconomic History    Marital status:    Tobacco Use    Smoking status: Never    Smokeless tobacco: Never   Vaping Use    Vaping status: Never Used   Substance and Sexual Activity    Alcohol use: Yes     Comment: 1-3 a week    Drug use: No    Sexual activity: Never     History   Drug Use No     Available pre-op labs reviewed.  Lab Results   Component Value Date    WBC 8.6 05/12/2024    RBC 4.90 05/12/2024    HGB 14.5 05/12/2024    HCT 45.0 05/12/2024    MCV 91.8 05/12/2024    MCH 29.6 05/12/2024    MCHC 32.2 05/12/2024    RDW 14.0 05/12/2024    .0 05/12/2024     Lab Results   Component Value Date     05/12/2024    K 3.9 05/12/2024     05/12/2024    CO2 29.0 05/12/2024    BUN 28 (H) 05/12/2024    CREATSERUM 1.33 (H) 05/12/2024    GLU 90 05/12/2024    CA 9.8 05/12/2024            Airway      Mallampati: II  Mouth opening: >3 FB  TM distance: > 6 cm  Neck ROM: full Cardiovascular    Cardiovascular exam normal.         Dental    Dentition appears grossly intact         Pulmonary    Pulmonary exam normal.                 Other findings              ASA: 3   Plan: general  NPO status verified and           Plan/risks discussed with: patient                Present on Admission:  **None**

## 2024-08-05 NOTE — ANESTHESIA PROCEDURE NOTES
Airway  Date/Time: 8/5/2024 12:21 PM  Urgency: elective      General Information and Staff    Patient location during procedure: OR  Anesthesiologist: Isaiah Johnson MD  Performed: anesthesiologist   Performed by: Isaiah Johnson MD  Authorized by: Isaiah Johnson MD      Indications and Patient Condition  Indications for airway management: anesthesia  Sedation level: deep  Preoxygenated: yes  Patient position: sniffing  Mask difficulty assessment: 1 - vent by mask    Final Airway Details  Final airway type: endotracheal airway      Successful airway: ETT  Cuffed: yes   Successful intubation technique: Video laryngoscopy  Endotracheal tube insertion site: oral  Blade: GlideScope  Blade size: #4  ETT size (mm): 7.0    Cormack-Lehane Classification: grade I - full view of glottis  Placement verified by: capnometry   Measured from: lips  ETT to lips (cm): 22  Number of attempts at approach: 1

## 2024-08-06 LAB
HCT VFR BLD AUTO: 28.2 %
HGB BLD-MCNC: 9.1 G/DL

## 2024-08-06 PROCEDURE — 85014 HEMATOCRIT: CPT

## 2024-08-06 PROCEDURE — 85018 HEMOGLOBIN: CPT

## 2024-08-06 RX ORDER — HYDROCODONE BITARTRATE AND ACETAMINOPHEN 10; 325 MG/1; MG/1
1 TABLET ORAL EVERY 4 HOURS PRN
Status: DISCONTINUED | OUTPATIENT
Start: 2024-08-06 | End: 2024-08-08

## 2024-08-06 RX ORDER — HYDROCODONE BITARTRATE AND ACETAMINOPHEN 10; 325 MG/1; MG/1
2 TABLET ORAL EVERY 6 HOURS PRN
Status: DISCONTINUED | OUTPATIENT
Start: 2024-08-06 | End: 2024-08-08

## 2024-08-06 NOTE — PHYSICAL THERAPY NOTE
Order received for PT eval and chart reviewed. Pt remains on flat bedrest until 8pm tonight. PT will follow up 8/7/24.

## 2024-08-06 NOTE — PLAN OF CARE
Patient A&O X4 on RA. VSS, /IS. SCDs/TEDs. Solares in place and draining, LBM 8/5. Surgical incision to lower back covered with microfoam tape, c/d/i. Hemovac drain to gravity in place. Pain controlled with PO medication. Gel ice as needed. Flat bedrest. PT/OT to eval when cleared. Reminded to use call light.

## 2024-08-06 NOTE — OCCUPATIONAL THERAPY NOTE
Attempted to see pt for skilled OT services this date. Pt is on flat bedrest until 8pm tonight. Will re-attempt tomorrow as schedule allows. Neela Avery, 08/06/24, 12:38 PM

## 2024-08-06 NOTE — PLAN OF CARE
POD 1 lumbar fusion / decompression w/ incidental dural tear, Pt is AAOX4, VSS, very Pala w/ HA's, post-op anguiano in place, Duvol drain to gravity, flat bed rest until 8 pm tonight, Pt developed spinal HA when HOB was raised, PT / OT eval pending, pain control, Pt doing well, all needs met, all safety measures in place, call light within reach, will CTM.       [id number]: Per Dr. Moreno he requests Pt be flat bed rest until the morning 8/7 and spine will give orders to follow.

## 2024-08-06 NOTE — PROGRESS NOTES
S: Minimal back pain.  No leg pain.  No new numbness, or weakness.  No headaches.  She has been laying flat.  She has no other complaints other than she would like to sit upright.     Inspection:  Awake alert No acute distress. No difficulty breathing     Blood pressure 131/56, pulse 72, temperature 97.9 °F (36.6 °C), temperature source Oral, resp. rate 18, height 5' 4\" (1.626 m), weight 225 lb (102.1 kg), SpO2 95%, not currently breastfeeding.    Recent Labs   Lab 08/06/24  0526   HGB 9.1*   HCT 28.2*       Lumbar/Sacral Integument: Incision/ incisions;  Dressing in place    Strength: Strength of bilateral lower extremities:     Left Right    EHL 5/5 5/5    DF 5/5 5/5    PF 5/5 5/5    Quads 5/5 5/5    IP 5/5 5/5  Sensation: No sensory deficits noted on bilateral lower extremities    Calves supple, NT bilaterally    A/P: POD # 1 s/p L2-5 decompression and un instrumented fusion with repair of durotomy    P: OK to sit upright at 10 degrees every 15 minutes.  Once upright then can get into a chair.  If no headache then can work with PT/OT.  If headache presents then she should lay flat until tomorrow morning.  Continue to monitor drain output, and keep to gravity.  Hopefully home tomorrow.    Pedro Pearson PA-C

## 2024-08-06 NOTE — CONSULTS
.Reason for consult: periop mgmt    Consulted by: Dr. Moreno    PCP: Shauna Baez MD      History of Present Illness: Patient is a 84 year old female with PMH sig for lumbar stenosis who presented for lumbar fusion. Lumbar durotomy and repair was done. Pt currently denies any complaints. No sob/nausea. Pain controlled.       PMH:  Past Medical History:    Arrhythmia    SVT AND ATRIAL FLUTTER    Back problem    Cancer (HCC)    Breast cancer    Colon adenoma    Compression of lumbar nerve root    Degenerative lumbar spinal stenosis    Diaphragmatic hernia without mention of obstruction or gangrene    Difficult intubation    Disorder of thyroid    Esophageal reflux    Exposure to medical diagnostic radiation    Last treatment 01/2024    Hearing impairment    Roman HA's    High blood pressure    History of stomach ulcers    A FEW YEARS AGO    Hypothyroidism    Mixed hyperlipidemia    OBESITY    Osteoarthrosis, unspecified whether generalized or localized, unspecified site    Osteopenia    TINNITUS NOS    Unspecified vitamin D deficiency    Visual impairment    glasses        PSH:  Past Surgical History:   Procedure Laterality Date    Arthroscopy of joint unlisted Right     knee    Colonoscopy  9/2003    normal    Colonoscopy  10/6/11 - [facility]    adenoma, hemorrhoids. repeat 2021.    Colonoscopy,diagnostic  11/17/16    normal    Excisional biospy right  1995    benign    Hernia surgery  1985    inguinal    Hysterectomy  1985    with BSO    Repair rotator cuff,acute Right 6/2014    Tonsillectomy      removed in childhood around age 12    Upper gi endoscopy - referral  7/9/12 - [facility]    small gastric erosion, no etiology for bleeding identifiedesophageal and gastric bx negative    Upper gi endoscopy,diagnosis  9/2003    hiatal hernia        Home Medications:  Outpatient Medications Marked as Taking for the 8/5/24 encounter (Hospital Encounter)   Medication Sig Dispense Refill    HYDROcodone-acetaminophen  MG Oral Tab Take  1 tablet by mouth every 6 (six) hours as needed.      anastrozole 1 MG Oral Tab tab Take 1 tablet (1 mg total) by mouth daily.      apixaban 5 MG Oral Tab Take 1 tablet (5 mg total) by mouth 2 (two) times daily.      methocarbamol 500 MG Oral Tab Take 1 tablet (500 mg total) by mouth 3 (three) times daily.      levothyroxine 88 MCG Oral Tab Take 1 tablet (88 mcg total) by mouth before breakfast.      aspirin 81 MG Oral Tab EC Take 1 tablet (81 mg total) by mouth daily.      Lisinopril-hydroCHLOROthiazide 20-12.5 MG Oral Tab Take 1 tablet by mouth daily. 90 tablet 3    lisinopril 20 MG Oral Tab Take 1 tablet (20 mg total) by mouth daily. 90 tablet 3    metoprolol tartrate 50 MG Oral Tab Take 1 tablet (50 mg total) by mouth 2 (two) times daily. 180 tablet 3    Multiple Vitamin (TAB-A-AGUEDA) Oral Tab Take 1 tablet by mouth daily.      CALCIUM 600 + D OR Take 1 tablet by mouth daily.           Scheduled Medication:   sennosides  17.2 mg Oral Nightly    docusate sodium  100 mg Oral BID    ceFAZolin  2 g Intravenous Q8H    anastrozole  1 mg Oral Daily    levothyroxine  88 mcg Oral Before breakfast    [START ON 8/6/2024] lisinopril  20 mg Oral Daily    lisinopril-hydroCHLOROthiazide  1 tablet Oral Daily    metoprolol tartrate  50 mg Oral BID     Continuous Infusing Medication:   lactated ringers 20 mL/hr at 08/05/24 1212     PRN Medication:  sodium chloride    polyethylene glycol (PEG 3350)    magnesium hydroxide    bisacodyl    fleet enema    ondansetron    metoclopramide    diphenhydrAMINE **OR** diphenhydrAMINE    benzocaine-menthol    oxyCODONE **OR** oxyCODONE    HYDROmorphone **OR** HYDROmorphone    methocarbamol    diazePAM     ALL:  Allergies   Allergen Reactions    Erythromycin Base NAUSEA AND VOMITING    Talwin [Pentazocine Lactate] NAUSEA AND VOMITING     CRAMP    Levofloxacin [Quixin]      Pt does want to take due to possible side effects     Daypro [Oxaprozin] NAUSEA ONLY    Tetracycline Base RASH        Soc  Hx:  Social History     Tobacco Use    Smoking status: Never    Smokeless tobacco: Never   Substance Use Topics    Alcohol use: Yes     Comment: 1-3 a week        Fam Hx  Family History   Problem Relation Age of Onset    Breast Cancer Sister 46        dx 46 passed 51    Other (Other) Sister         hypothyroid    Lipids Father     Hypertension Father     Heart Disorder Father     Other (Other) Daughter         hypothyroid    Other (Other) Daughter         hypothyroid    Other (Other) Daughter         hypothyroid    Breast Cancer Maternal Aunt 60        age at dx 60    Breast Cancer Cousin 48        dx 48       Review of Systems  Comprehensive ROS reviewed and negative except for what's stated above.  Including negative for chest pain, shortness of breath, syncope.       OBJECTIVE:  /57 (BP Location: Left arm)   Pulse 61   Temp 97.5 °F (36.4 °C) (Oral)   Resp 16   Ht 5' 4\" (1.626 m)   Wt 225 lb (102.1 kg)   SpO2 99%   BMI 38.62 kg/m²   General:  Alert, no distress, appears stated age.   Head:  Normocephalic, without obvious abnormality, atraumatic.   Eyes:  Sclera anicteric, No conjunctival pallor, EOMs intact.    Nose: Nares normal. Septum midline. Mucosa normal. No drainage.   Throat: Lips, mucosa, and tongue normal. Teeth and gums normal.   Neck: Supple, symmetrical, trachea midline, no cervical or supraclavicular lymph adenopathy, thyroid: no enlargment/tenderness/nodules appreciated   Lungs:   Clear to auscultation bilaterally. Normal effort   Chest wall:  No tenderness or deformity.   Heart:  Regular rate and rhythm, S1, S2 normal, no murmur, rub or gallop appreciated   Abdomen:   Soft, non-tender. Bowel sounds normal. No masses,  No organomegaly. Non distended   Extremities: Extremities normal, atraumatic, no cyanosis or edema.   Skin: Skin color, texture, turgor normal. No rashes or lesions.    Neurologic: Normal strength, no focal deficit appreciated       Diagnostics:   CBC/Chem  No results for  input(s): \"WBC\", \"HGB\", \"MCV\", \"PLT\", \"BAND\", \"INR\" in the last 168 hours.    Invalid input(s): \"LYM#\", \"MONO#\", \"BASOS#\", \"EOSIN#\"    No results for input(s): \"NA\", \"K\", \"CL\", \"CO2\", \"BUN\", \"CREATSERUM\", \"GLU\", \"CA\", \"CAION\", \"MG\", \"PHOS\" in the last 168 hours.    No results for input(s): \"ALT\", \"AST\", \"ALB\", \"AMYLASE\", \"LIPASE\", \"LDH\" in the last 168 hours.    Invalid input(s): \"ALPHOS\", \"TBIL\", \"DBIL\", \"TPROT\"      Radiology: XR OR LUMBAR SPINE (1 VIEW)   (CPT=72020)    Result Date: 8/5/2024  PROCEDURE:  XR OR LUMBAR SPINE (1 VIEW)   (CPT=72020)  TECHNIQUE:  Single lateral view of the spine was obtained.  COMPARISON:  External Exams, XR, XR LUMBAR SPINE (MIN 4 VIEWS) (CPT=72110), 2/29/2024, 1:04 PM.  INDICATIONS:  Lumbar decompression.  PATIENT STATED HISTORY: (As transcribed by Technologist)  Lumbar decompression.    FINDINGS:    3 lateral views of the lumbosacral spine from the OR currently placed into PACs, displayed with 2 different window and level settings.  The 1st demonstrates posterior metallic instruments at L5-S1 and L3.  The 2nd demonstrates posterior soft  tissue retractors, with 2 metallic instruments, tips at inferior L2 and L4 levels respectively.  The 3rd demonstrates posterior soft tissue retractors with a single additional metallic instrument, tip at the L5 level.  Please also correlate with surgical findings.             CONCLUSION:  Intraoperative lateral views of the lumbar spine for surgical planning.   LOCATION:  [facility]   Dictated by (CST): Ramy Dodson MD on 8/05/2024 at 3:05 PM     Finalized by (CST): Ramy Dodson MD on 8/05/2024 at 3:06 PM            ASSESSMENT / PLAN:    # lumbar stenosis s/p fusion and repair of incidental durotomy  bowel regimen, prn pain meds, SCDs  Bed rest for now per spine    # PAfl  Cont bb  Hold eliquis until okay to resume per spine    # htn  Cont lisinopril/hydrochlorothiazide, bb          Thank You,  Siena Garcia MD  [facility] Hospitalist  Pager  [phone]  Answering Service number: [phone]

## 2024-08-07 PROCEDURE — 97165 OT EVAL LOW COMPLEX 30 MIN: CPT

## 2024-08-07 PROCEDURE — 97530 THERAPEUTIC ACTIVITIES: CPT

## 2024-08-07 PROCEDURE — 97116 GAIT TRAINING THERAPY: CPT

## 2024-08-07 PROCEDURE — 97162 PT EVAL MOD COMPLEX 30 MIN: CPT

## 2024-08-07 NOTE — PLAN OF CARE
A&Ox4. VSS. On room air. . Teds and SCDs. Ankle pumps encouraged. Tolerating diet. Last BM 8/5. Solares catheter in place postop. Pain managed with PO medication. Surgical dressing to back C/D/I, gel ice in place. Hemovac drain to gravity as ordered. Bedrest until 9am - HOB elevated as ordered. Patient denies any increase in baseline headache. Patient and daughter updated and in agreement with plan of care. Safety precautions in place. Instructed patient to call for assistance, call light within reach.

## 2024-08-07 NOTE — OCCUPATIONAL THERAPY NOTE
OCCUPATIONAL THERAPY EVALUATION - INPATIENT     Room Number: 372/372-A  Evaluation Date: 8/7/2024  Type of Evaluation: Initial  Presenting Problem: s/p L2-3, L3-4, L4-5 decompression, uninstrumented fusion, and repair of incidental durotomy 8/5/24    Physician Order: IP Consult to Occupational Therapy  Reason for Therapy: ADL/IADL Dysfunction and Discharge Planning    OCCUPATIONAL THERAPY ASSESSMENT   Patient is currently functioning below baseline with toileting, upper body dressing, lower body dressing, bed mobility, transfers, static sitting balance, dynamic sitting balance, static standing balance, dynamic standing balance, maintaining seated position, functional standing tolerance, energy conservation strategies, and aerobic capacity. Prior to admission, patient's baseline is IND c ADLs/IADLs. Pt lives at home alone and is retired. Per pt daughter, daughter will be staying with pt during recovery.  Patient is requiring moderate assist as a result of the following impairments: decreased functional strength, decreased functional reach, decreased endurance, pain, impaired standing balance, and decreased muscular endurance. Occupational Therapy will continue to follow for duration of hospitalization.    Patient will benefit from continued skilled OT Services at discharge to promote prior level of function and safety with additional support and return home with home health OT      History Related to Current Admission: Patient is a 84 year old female admitted on 8/5/2024 with Presenting Problem: s/p L2-3, L3-4, L4-5 decompression, uninstrumented fusion, and repair of incidental durotomy 8/5/24. Co-Morbidities : breast CA, HTN, s/p L TKA    WEIGHT BEARING RESTRICTION  Weight Bearing Restriction: None                Recommendations for nursing staff:   Transfers: up x 1 person, RW, CGA  Toileting location: Bedlevel    EVALUATION SESSION:  Patient Start of Session: Supine in bed.  FUNCTIONAL TRANSFER ASSESSMENT  Sit to  Stand: Edge of Bed  Edge of Bed: Contact Guard Assist    BED MOBILITY  Supine to Sit : Moderate Assist  Scooting: Supervision    BALANCE ASSESSMENT  Static Sitting: Supervision  Static Standing: Contact Guard Assist    FUNCTIONAL ADL ASSESSMENT       ACTIVITY TOLERANCE: Pt completed bed mobility requiring modA and verbal cues for log roll to maintain spinal precautions. Pt completed sit <> stand with use of RW requiring CGA to prepare for standing ADL tasks. Pt ambulated 10 feet with use of RW and CGA and reported pain increased to 5/10 when ambulating.           BP: 156/59  BP Location: Right arm  BP Method: Automatic  Patient Position: Sitting    O2 SATURATIONS       COGNITION  Overall Cognitive Status:  WFL - within functional limits    Upper Extremity   ROM: within functional limits  Strength: within functional limits  Coordination  Gross motor: WFL  Fine motor: WFL  Sensation: Light touch:  intact    EDUCATION PROVIDED  Patient: Role of Occupational Therapy; Plan of Care; Functional Transfer Techniques; Fall Prevention; Surgical Precautions; Posture/Positioning; Energy Conservation; Proper Body Mechanics; Adaptive Equipment Recommendations; DME Recommendations  Patient's Response to Education: Verbalized Understanding; Requires Further Education  Family/Caregiver: -- (Same)  Family/Caregiver's Response to Education: Verbalized Understanding    Equipment used: RW  Demonstrates functional use.     Therapist comments: Pt flat bedrest orders lifted this morning. Pt was motivated but cautious throughout session. Pt educated on spinal precautions to follow during recovery; pt verbalized understanding. Pt completed bed mobility utilizing logroll technique with MODA to sit EOB to prepare for seated ADL tasks. Pt completed sit <> stand with use of RW and CGA; pt ambulated 10 feet into hallway with RW and CGA; pt reported increase in pain to lower back region. Pt returned to room to sit in chair; pt demonstrated good  body mechanics to lower self into chair to maintain spinal precautions. Pt informed of ADLs to be assessed at next session once pain improves and anguiano is removed; pt in agreement. Pt daughter present during session and reports she will be staying with pt during recovery to assist as needed.     Patient End of Session: Up in chair;Needs met;Call light within reach;RN aware of session/findings;All patient questions and concerns addressed;SCDs in place;Alarm set;Family present    OCCUPATIONAL PROFILE    HOME SITUATION  Type of Home: House  Home Layout: One level  Lives With: Alone    Toilet and Equipment: Comfort height toilet  Shower/Tub and Equipment: Walk-in shower (built in bench)  Other Equipment: Long-handled shoehorn;Reacher (RW, cane)    Occupation/Status: Retired  Hand Dominance: Left  Drives: Yes  Patient Regularly Uses: Hearing aides    Prior Level of Function: Per pt daughter, pt is IND c ADLs/IADLs and lives at home alone, is retired, and was driving prior to this admission.    SUBJECTIVE   \"I do my own grocery shopping\"    PAIN ASSESSMENT  Ratin  Location: Lower back region/incision area  Management Techniques: Repositioning;Activity promotion;Body mechanics    OBJECTIVE  Precautions: Spine;Bed/chair alarm;Hard of hearing, Drain  Fall Risk: High fall risk      ASSESSMENTS    AM-PAC ‘6-Clicks’ Inpatient Daily Activity Short Form  -   Putting on and taking off regular lower body clothing?: A Little  -   Bathing (including washing, rinsing, drying)?: A Little  -   Toileting, which includes using toilet, bedpan or urinal? : A Lot  -   Putting on and taking off regular upper body clothing?: A Little  -   Taking care of personal grooming such as brushing teeth?: A Little  -   Eating meals?: None    AM-PAC Score:  Score: 18  Approx Degree of Impairment: 46.65%  Standardized Score (AM-PAC Scale): 38.66    ADDITIONAL TESTS     NEUROLOGICAL FINDINGS      COGNITION ASSESSMENTS       PLAN  OT Treatment Plan:  Balance activities;Energy conservation/work simplification techniques;ADL training;IADL training;Functional transfer training;UE strengthening/ROM;Endurance training;Patient/Family education;Patient/Family training;Equipment eval/education;Compensatory technique education;Continued evaluation  Rehab Potential : Good  Frequency: 3x/week  Number of Visits to Meet Established Goals: 2    ADL Goals   Patient will perform upper body dressing:  with supervision  Patient will perform lower body dressing:  with supervision and with adaptive equipment PRN  Patient will perform toileting: with supervision    Functional Transfer Goals  Patient will transfer from supine to sit:  with min assist  Patient will transfer from sit to stand:  with supervision  Patient will transfer to toilet:  with supervision      Therapist Goals  Pt will recall spinal precautions to promote proper healing during recovery.  Patient Evaluation Complexity Level:   Occupational Profile/Medical History LOW - Brief history including review of medical or therapy records    Specific performance deficits impacting engagement in ADL/IADL MODERATE  3 - 5 performance deficits   Client Assessment/Performance Deficits MODERATE - Comorbidities and min to mod modifications of tasks    Clinical Decision Making LOW - Analysis of occupational profile, problem-focused assessments, limited treatment options    Overall Complexity LOW     OT Session Time: 25 minutes  Therapeutic Activity: 20 minutes

## 2024-08-07 NOTE — PROGRESS NOTES
.[facility] note    PCP: Shauna Baez MD    Chief Complaint:  F/u lumbar fusion    SUBJECTIVE:  Patient denies any chest pain, palpitations, shortness of breath, cough, nausea, vomiting.  Patient headache improved.    OBJECTIVE:  Temp:  [97.8 °F (36.6 °C)-98.6 °F (37 °C)] 98.6 °F (37 °C)  Pulse:  [80-89] 82  Resp:  [16-20] 20  BP: (123-130)/(39-49) 123/41  SpO2:  [88 %-96 %] 95 %    Intake/Output:    Intake/Output Summary (Last 24 hours) at 8/7/2024 1026  Last data filed at 8/7/2024 0600  Gross per 24 hour   Intake 2360 ml   Output 1820 ml   Net 540 ml       Last 3 Weights   08/05/24 1712 225 lb (102.1 kg)   08/05/24 1119 225 lb (102.1 kg)   07/16/24 1614 226 lb (102.5 kg)   05/12/24 1202 220 lb (99.8 kg)   10/30/23 0721 224 lb (101.6 kg)   10/11/23 1153 220 lb (99.8 kg)       Exam  Gen: No acute distress  Pulm: Lungs clear, normal respiratory effort, no crackles, no wheezing  CV: Heart with regular rate and rhythm, no peripheral edema  Abd: Abdomen soft, nontender, nondistended,  bowel sounds present  MSK: No significant pitting edema or tenderness of the lower extremities  Skin: no new rashes or lesions  Neuro: A&OX3, no focal deficits    Data Review:         Labs:     Recent Labs   Lab 08/06/24  0526   HGB 9.1*       No results for input(s): \"NA\", \"K\", \"CL\", \"CO2\", \"BUN\", \"CREATSERUM\", \"CA\", \"CAION\", \"MG\", \"PHOS\", \"GLU\" in the last 168 hours.    No results for input(s): \"ALT\", \"AST\", \"ALB\", \"AMYLASE\", \"LIPASE\" in the last 168 hours.    Invalid input(s): \"ALPHOS\", \"TBIL\", \"DBIL\", \"TPROT\"    No results for input(s): \"TROP\", \"CK\", \"PBNP\", \"PCT\" in the last 168 hours.    No results for input(s): \"CRP\", \"MOOSE\", \"LDH\", \"DDIMER\" in the last 168 hours.    No results for input(s): \"PGLU\" in the last 168 hours.    Meds:   Scheduled Medication:   sennosides  17.2 mg Oral Nightly    docusate sodium  100 mg Oral BID    anastrozole  1 mg Oral Daily    levothyroxine  88 mcg Oral Before breakfast    lisinopril  20 mg Oral  Daily    metoprolol tartrate  50 mg Oral 2x Daily(Beta Blocker)    lisinopril (Prinivil; Zestril) 20 mg, hydroCHLOROthiazide 12.5 mg for Zestoretic 20-12.5 ([facility] only)   Oral Daily     Continuous Infusing Medication:   lactated ringers 20 mL/hr at 08/05/24 1212     PRN Medication:  HYDROcodone-acetaminophen **OR** HYDROcodone-acetaminophen    polyethylene glycol (PEG 3350)    magnesium hydroxide    bisacodyl    fleet enema    ondansetron    metoclopramide    diphenhydrAMINE **OR** diphenhydrAMINE    benzocaine-menthol    HYDROmorphone **OR** HYDROmorphone    methocarbamol    diazePAM       Microbiology:    No results found for this visit on 08/05/24.    No results found for: \"COVID19\"     Assessment/Plan:     # lumbar stenosis s/p fusion and repair of incidental durotomy  bowel regimen, prn pain meds, SCDs  Placed back on bed rest after trying to raise HOB today due to development of some HA  Management per ortho spine  PT/OT when ok with ortho spine     # PAfl  Cont bb  Hold eliquis until okay to resume per spine     # htn  Cont lisinopril/hydrochlorothiazide, bb    #Hypothyroidism  -levothyroxine     Dispo: no discharge    Jian Pimentel MD  [facility]  [phone]

## 2024-08-07 NOTE — PLAN OF CARE
Patient A & O x4. VSS, on RA. C/o mild pain, PO valium given. Dressing to back is clean dry and intact. Gel ice wrap applied. Drain to gravity. Solares catheter. Flat bedrest maintained. Safety measures in place. Instructed to use call light.

## 2024-08-07 NOTE — PHYSICAL THERAPY NOTE
PHYSICAL THERAPY EVALUATION - INPATIENT     Room Number: 372/372-A  Evaluation Date: 8/7/2024  Type of Evaluation: Initial  Physician Order: PT Eval and Treat    Presenting Problem: s/p L2-5 decompression and uninstrumented fusion with repair of incidental durotomy 8/5/24  Co-Morbidities : breast CA, HTN, s/p L TKA  Reason for Therapy: Mobility Dysfunction and Discharge Planning    PHYSICAL THERAPY ASSESSMENT   Patient is currently functioning near baseline with bed mobility, transfers, and gait. Prior to admission, patient's baseline is MOD I.   Patient is requiring contact guard assist and moderate assist as a result of the following impairments: pain, impaired standing balance, decreased muscular endurance, and difficulty maintaining precautions.  Physical Therapy will continue to follow for duration of hospitalization.    Patient will benefit from continued skilled PT Services at discharge to promote prior level of function and safety with additional support and return home with home health PT.    PLAN  PT Treatment Plan: Bed mobility;Endurance;Energy conservation;Patient education;Gait training;Strengthening;Balance training;Transfer training  Rehab Potential : Good  Frequency (Obs): Daily  Number of Visits to Meet Established Goals: 3    CURRENT GOALS  Goal #1  Patient is able to demonstrate supine - sit EOB @ level: supervision     Goal #2  Patient is able to demonstrate transfers Sit to/from Stand at assistance level: supervision   Goal #3    Patient is able to ambulate 150 feet with assistive device at assistance level: supervision   Goal #4    Patient will negotiate one curb w/ assistive device and supervision   Goal #5    Patient verbalizes and/or demonstrates all precautions and safety concerns independently    Goal #6      Goal Comments: Goals established on 8/7/2024    PHYSICAL THERAPY MEDICAL/SOCIAL HISTORY  History related to current admission: Patient is a 84 year old female admitted on 8/5/2024  from home for elective L2-5 decompression and uninstrumented fusion with repair of incidental durotomy Pt on bedrest post operatively.     HOME SITUATION  Type of Home: House   Home Layout: One level  Stairs to Enter : 1             Lives With: Alone  Drives: Yes  Patient Owned Equipment: Rolling walker;Cane  Patient Regularly Uses: Hearing aides    Prior Level of [place]: Pt lives alone at [facility]. Pt independent with ADL and mobility. Pt does not use RW or cane for ambulation. Pt enjoys participating in water aerobics. Pts 2 daughters will stay with her at discharge.     SUBJECTIVE  \"It feels good to be up.\"     OBJECTIVE  Precautions: Spine;Bed/chair alarm;Hard of hearing  Fall Risk: High fall risk    WEIGHT BEARING RESTRICTION  Weight Bearing Restriction: None                PAIN ASSESSMENT  Ratin  Location: back  Management Techniques: Activity promotion;Repositioning    COGNITION  Overall Cognitive Status:  WFL - within functional limits    RANGE OF MOTION AND STRENGTH ASSESSMENT  Upper extremity ROM and strength are within functional limits     Lower extremity ROM is within functional limits     Lower extremity strength is within functional limits       BALANCE  Static Sitting: Good  Dynamic Sitting: Good  Static Standing: Fair -  Dynamic Standing: Poor +    ADDITIONAL TESTS                                    ACTIVITY TOLERANCE                         O2 WALK       NEUROLOGICAL FINDINGS                        AM-PAC '6-Clicks' INPATIENT SHORT FORM - BASIC MOBILITY  How much difficulty does the patient currently have...  Patient Difficulty: Turning over in bed (including adjusting bedclothes, sheets and blankets)?: A Lot   Patient Difficulty: Sitting down on and standing up from a chair with arms (e.g., wheelchair, bedside commode, etc.): A Little   Patient Difficulty: Moving from lying on back to sitting on the side of the bed?: A Lot   How much help from another person does the  patient currently need...   Help from Another: Moving to and from a bed to a chair (including a wheelchair)?: A Little   Help from Another: Need to walk in hospital room?: A Little   Help from Another: Climbing 3-5 steps with a railing?: A Little       AM-PAC Score:  Raw Score: 16   Approx Degree of Impairment: 54.16%   Standardized Score (AM-PAC Scale): 40.78   CMS Modifier (G-Code): CK    FUNCTIONAL ABILITY STATUS  Gait Assessment   Functional Mobility/Gait Assessment  Gait Assistance: Contact guard assist  Distance (ft): 40  Assistive Device: Rolling walker    Skilled Therapy Provided  VC for log roll with bed mobility.   MOD assist for trunk support with supine-sit.   VC for transfer set up.   CGA for safety with sit-stand to RW.   Demos good WB in static and dynamic standing.   VC for gait sequencing.   Ambulatory with RW and CGA for safety.   Ambulates with slow alicia and step through gait pattern.   Demos good pacing and safety.   Returned to room sitting up in bedside chair.     Bed Mobility:  Rolling: supervision  Supine to sit: MOD   Sit to supine: NT     Transfer Mobility:  Sit to stand: CGA   Stand to sit: CGA  Gait = CGA    Therapist's Comments:  Reviewed spine precautions, log roll, and activity recommendations.     Exercise/Education Provided:  Bed mobility  Energy conservation  Functional activity tolerated  Gait training  Posture  Strengthening  Transfer training    Patient End of Session: Needs met;Call light within reach;RN aware of session/findings;All patient questions and concerns addressed;Ice applied;Family present;Up in chair    Patient Evaluation Complexity Level:  History Moderate - 1 or 2 personal factors and/or co-morbidities   Examination of body systems Moderate - addressing a total of 3 or more elements   Clinical Presentation  Moderate - Evolving   Clinical Decision Making Moderate Complexity       PT Session Time: 30 minutes  Gait Training: 10 minutes  Therapeutic Activity:   minutes  Neuromuscular Re-education:  minutes  Therapeutic Exercise:  minutes

## 2024-08-07 NOTE — OPERATIVE REPORT
[facility]    PATIENT'S NAME: DANYELL HARRIS   ATTENDING PHYSICIAN: JEWELS Moreno M.D.   OPERATING PHYSICIAN: JEWELS Moreno M.D.   PATIENT ACCOUNT#:   [id number]    LOCATION:  [address] [place] [address]  MEDICAL RECORD #:   [id number]       YOB: 1940  ADMISSION DATE:       08/05/2024      OPERATION DATE:  08/05/2024    OPERATIVE REPORT    PREOPERATIVE DIAGNOSIS:  L2-3, L3-4, L4-5 stenosis.  POSTOPERATIVE DIAGNOSIS:  L2-3, L3-4, L4-5 stenosis.  PROCEDURE PERFORMED:  L2-3, L3-4, L4-5 decompression, uninstrumented fusion, and repair of incidental durotomy.    INDICATIONS:  The patient had failed reasonable conservative measures which are outlined in the patient's clinic medical record.  Physical therapy, medical management, injections, alternative treatment are among those offered unless motor deficits are progressive. Indications for surgery are based on scientific literature and ANISA guidelines.  A thorough meeting with the patient and at least one key caregiver was had.  The risks and benefits were discussed in significant detail.  The risks include, but are not limited to, bleeding, infection, spinal leaks, nerve injuries, recurrent disc herniation, lumbar instability, and need for further surgery.  I have gone over the operation using models, diagrams, and the patient's own imaging studies. Additional written and digital sources were provided.  No guarantees were made.    ASSISTANT:  Alina Todd PA-C.  A skilled and experienced assistant was required for the entire surgical procedure.  As a lumbar spinal reconstruction, the procedure is done in immediate proximity to critical neural elements and is done in close proximity to the critical vascular and visceral structures.  Much of the surgery is done in and around the cauda equine and its exiting nerves.  Any assistance, including, but not limited to, retraction, suction, and other means of facilitation of the procedure requires an individual with  substantial postgraduate training and substantial spinal surgical experience.    ESTIMATED BLOOD LOSS:  250 mL.     DRAINS:  A 1/8-inch Davol to gravity.     DESCRIPTION OF PROCEDURE:  After obtaining informed consent, the patient was taken to the operating room.  SCDs and EARNEST hose were applied.  Preoperative antibiotics were delivered.  The patient was placed in the prone position on a Bert frame.  All bony prominences were padded.  Incision was marked and locally prepped.  A spinal needle was applied.  X-ray confirmed appropriate localization of our incision.  The needle was removed, and the back was sterilely prepped and draped.  A longitudinal incision was made with a #10 blade.  Bovie electrocautery was used for hemostasis. Dissection was taken down to the level of the fascia.  Subperiosteal dissection was taken at the level of the facet joints but keeping the facet capsules entirely intact.  Self-retaining retractors were applied.    We first began at the proximal level of our decompression and removed portions of the interspinous ligament and spinous processes.  This was done at all indicated levels.  Curettes were used to gain access to the canal.  Schreiber ball was used to dissect ligamentum flavum away and free from the neural elements.  Then 4 and 5-mm Kerrisons were used to resect thickened ligamentum flavum and central canal stenosis of the lamina.    Attention then turned to the L4-5 level where the cranial and caudal nerve roots were both explored.  This was done by elevating a plane with the Schreiber ball and curettes between the ligamentum flavum and the dura.  With the dura dissected free and downward, we identified the plane and then removed this with 3, 4, and 5 mm Kerrisons.  Attention then turned to the foraminotomies, the cranial and caudal nerve roots at this level.  Contralateral decompression was undertaken thus creating a bilateral decompression and bilateral microforaminotomy and  hemilaminotomy using undercutting technique.  An undercutting facet sparing hemilaminectomy was also completed. With undercutting technique and a Schreiber ball in place, we excised ligamentum flavum through the bone at the cranial foramina as needed, facilitating a serial cranial decompression and undercutting microforaminotomy and hemilaminotomy versus undercutting hemilaminectomy.  This was done in an undercutting fashion as well and done to examine the central and contralateral neural elements facilitating central bilateral decompression as well. This was done using undercutting technique up to the level of the pedicle, with an extraforaminal area also being decompressed with the undercutting technique.  At the conclusion of the decompression, all areas were free of neurologic compression.      Attention then turned to the L3-4 level where the cranial and caudal nerve roots were both explored.  This was done by elevating a plane with the Schreiber ball and curettes between the ligamentum flavum and the dura.  With the dura dissected free and downward, we identified the plane and then removed this with 3, 4, and 5 mm Kerrisons.  Attention then turned to the foraminotomies, the cranial and caudal nerve roots at this level.  Contralateral decompression was undertaken thus creating a bilateral decompression and bilateral microforaminotomy and hemilaminotomy using undercutting technique.  An undercutting facet sparing hemilaminectomy was also completed. With undercutting technique and a Schreiber ball in place, we excised ligamentum flavum through the bone at the cranial foramina as needed, facilitating a serial cranial decompression and undercutting microforaminotomy and hemilaminotomy versus undercutting hemilaminectomy.  This was done in an undercutting fashion as well and done to examine the central and contralateral neural elements facilitating central bilateral decompression as well. This was done using undercutting  technique up to the level of the pedicle, with an extraforaminal area also being decompressed with the undercutting technique.  At the conclusion of the decompression, all areas were free of neurologic compression.      Attention then turned to the L2-3 level where the cranial and caudal nerve roots were both explored.  This was done by elevating a plane with the Schreiber ball and curettes between the ligamentum flavum and the dura.  With the dura dissected free and downward, we identified the plane and then removed this with 3, 4, and 5 mm Kerrisons.  Attention then turned to the foraminotomies, the cranial and caudal nerve roots at this level.  Contralateral decompression was undertaken thus creating a bilateral decompression and bilateral microforaminotomy and hemilaminotomy using undercutting technique.  An undercutting facet sparing hemilaminectomy was also completed. With undercutting technique and a Schreiber ball in place, we excised ligamentum flavum through the bone at the cranial foramina as needed, facilitating a serial cranial decompression and undercutting microforaminotomy and hemilaminotomy versus undercutting hemilaminectomy.  This was done in an undercutting fashion as well and done to examine the central and contralateral neural elements facilitating central bilateral decompression as well. This was done using undercutting technique up to the level of the pedicle, with an extraforaminal area also being decompressed with the undercutting technique.  It should be noted that we identified a midline incidental durotomy where there was significant adherence of the dura to the dorsal elements including the lamina itself.  Once identified, this was reapproximated with a running Nurolon stitch in watertight fashion.  At the conclusion of the repair, we were not able to identify any spinal leak with Valsalva maneuvers.  This was then sealed with DuraSeal and DuraGen.  At the conclusion of the decompression, all  areas were free of neurologic compression.      After assessing the complete decompression, we evaluated the facets.  An excess of 50% of the facets bilaterally were removed, thus increasing the likelihood of subsequent instability.  We then elected to proceed with the posterolateral fusion at the L2-3, L3-4, and L4-5 levels.  Posterior elements were decorticated.  Local bone was applied.    Final x-ray was taken to confirm appropriate levels were addressed. The wound was thoroughly irrigated.  Hemostasis was maintained.  FloSeal was applied to help with hemostasis and then was irrigated away. Valsalva maneuver confirmed that there was no spinal leak.  Duragen was applied, subfascial drain was applied.  The fascia was closed in water-tight fashion with figure-of-eight 1-0 Vicryl; 2-0 Vicryl was used for the subcutaneous tissues and running 3-0 subcuticular stitches were applied.  Steri-Strips were applied.  Sterile dressings were applied.  The patient was extubated and taken to the recovery room in stable condition and was neurologically intact at its baseline and stable on arrival.  Needle and Ray-Ramirez counts were correct at the conclusion of the case.    Dictated By JEWELS Moreno M.D.  d: 08/06/2024 13:15:12  t: 08/06/2024 13:51:28  Job [id number]  [facility]/

## 2024-08-07 NOTE — PROGRESS NOTES
[facility]   part of [facility]    Progress Note    Maritza Cantu Patient Status:  Inpatient    1940 MRN [id number]   Location [facility] 3SW-A Attending JEWELS Moreno MD   Hosp Day # 2 PCP Shauna Baez MD       S: Moderate back pain with no new radicular symptoms. She notes some continued left leg numbness and heaviness that she had prior to surgery. She states that overnight she was placed at a slight elevation although she should have been left flat. She notes a continued mild headache that has not changed since yesterday.     Inspection:  Awake alert No acute distress. No difficulty breathing     Blood pressure 123/41, pulse 82, temperature 98.6 °F (37 °C), temperature source Oral, resp. rate 20, height 5' 4\" (1.626 m), weight 225 lb (102.1 kg), SpO2 95%, not currently breastfeeding.    Recent Labs   Lab 24  0526   HGB 9.1*   HCT 28.2*       Lumbar/Sacral Integument: Incision/ incisions;  Dressing in place. Drain to gravity with output of 20 over 8 hours    Strength: Strength of bilateral lower extremities:     Left Right    EHL 5/5 5/5    DF 5/5 5/5    PF 5/5 5/5    Quads 5/5 5/5    IP 5/5 5/5  Sensation: No sensory deficits noted on bilateral lower extremities        A/P: POD # 2 s/p L2-L5 decompression and uninstrumented fusion, repair of incidental durotomy    P: Patient is doing well overall. Continues to have baseline headaches since surgery. Discussed care with Dr. Moreno and we will start to elevate patient this morning and see if her headaches increase at all. Once up and moving can discontinue the anguiano. Plans to discontinue drain this afternoon.     Alina Todd PA-C   24

## 2024-08-08 VITALS
OXYGEN SATURATION: 97 % | HEART RATE: 71 BPM | WEIGHT: 225 LBS | DIASTOLIC BLOOD PRESSURE: 58 MMHG | SYSTOLIC BLOOD PRESSURE: 144 MMHG | RESPIRATION RATE: 17 BRPM | HEIGHT: 64 IN | TEMPERATURE: 98 F | BODY MASS INDEX: 38.41 KG/M2

## 2024-08-08 PROCEDURE — 97116 GAIT TRAINING THERAPY: CPT

## 2024-08-08 PROCEDURE — 97535 SELF CARE MNGMENT TRAINING: CPT

## 2024-08-08 PROCEDURE — 97530 THERAPEUTIC ACTIVITIES: CPT

## 2024-08-08 RX ORDER — ASPIRIN 81 MG/1
81 TABLET ORAL DAILY
Status: SHIPPED | COMMUNITY
Start: 2024-08-08

## 2024-08-08 NOTE — PROGRESS NOTES
[facility]ist note    PCP: Shauna Baez MD    Chief Complaint:  F/u lumbar fusion    SUBJECTIVE:  Patient denies any chest pain, palpitations, shortness of breath, cough, nausea, vomiting.  Patient headache resolved. Patient pain controlled. Patient with BM and tolerating diet.     OBJECTIVE:  Temp:  [97.6 °F (36.4 °C)-98.3 °F (36.8 °C)] 97.6 °F (36.4 °C)  Pulse:  [68-98] 71  Resp:  [16-20] 17  BP: (141-156)/(52-59) 144/58  SpO2:  [94 %-98 %] 97 %    Intake/Output:    Intake/Output Summary (Last 24 hours) at 8/8/2024 0948  Last data filed at 8/8/2024 0842  Gross per 24 hour   Intake 120 ml   Output 2280 ml   Net -2160 ml       Last 3 Weights   08/05/24 1712 225 lb (102.1 kg)   08/05/24 1119 225 lb (102.1 kg)   07/16/24 1614 226 lb (102.5 kg)   05/12/24 1202 220 lb (99.8 kg)   10/30/23 0721 224 lb (101.6 kg)   10/11/23 1153 220 lb (99.8 kg)       Exam  Gen: No acute distress  Pulm: Lungs clear, normal respiratory effort, no crackles, no wheezing  CV: Heart with regular rate and rhythm, no peripheral edema  Abd: Abdomen soft, nontender, nondistended,  bowel sounds present  MSK: No significant pitting edema or tenderness of the lower extremities  Skin: no new rashes or lesions  Neuro: A&OX3, no focal deficits    Data Review:         Labs:     Recent Labs   Lab 08/06/24  0526   HGB 9.1*       No results for input(s): \"NA\", \"K\", \"CL\", \"CO2\", \"BUN\", \"CREATSERUM\", \"CA\", \"CAION\", \"MG\", \"PHOS\", \"GLU\" in the last 168 hours.    No results for input(s): \"ALT\", \"AST\", \"ALB\", \"AMYLASE\", \"LIPASE\" in the last 168 hours.    Invalid input(s): \"ALPHOS\", \"TBIL\", \"DBIL\", \"TPROT\"    No results for input(s): \"TROP\", \"CK\", \"PBNP\", \"PCT\" in the last 168 hours.    No results for input(s): \"CRP\", \"MOOSE\", \"LDH\", \"DDIMER\" in the last 168 hours.    No results for input(s): \"PGLU\" in the last 168 hours.    Meds:   Scheduled Medication:   sennosides  17.2 mg Oral Nightly    docusate sodium  100 mg Oral BID    anastrozole  1 mg Oral Daily     levothyroxine  88 mcg Oral Before breakfast    lisinopril  20 mg Oral Daily    metoprolol tartrate  50 mg Oral 2x Daily(Beta Blocker)    lisinopril (Prinivil; Zestril) 20 mg, hydroCHLOROthiazide 12.5 mg for Zestoretic 20-12.5 ([facility] only)   Oral Daily     Continuous Infusing Medication:   lactated ringers 20 mL/hr at 08/05/24 1212     PRN Medication:  HYDROcodone-acetaminophen **OR** HYDROcodone-acetaminophen    polyethylene glycol (PEG 3350)    magnesium hydroxide    bisacodyl    fleet enema    ondansetron    metoclopramide    diphenhydrAMINE **OR** diphenhydrAMINE    benzocaine-menthol    HYDROmorphone **OR** HYDROmorphone    methocarbamol    diazePAM       Microbiology:    No results found for this visit on 08/05/24.    No results found for: \"COVID19\"     Assessment/Plan:     # lumbar stenosis s/p fusion and repair of incidental durotomy  bowel regimen, prn pain meds, SCDs  Placed back on bed rest after trying to raise HOB due to development of some HA--> pt without headache and able to sit up and stand up without major issues  Management per ortho spine--> ok for dc today   PT/OT when ok with ortho spine--> home with home pt/ot     # PAfl  Cont bb  Hold eliquis until okay to resume per spine     # htn  Cont lisinopril/hydrochlorothiazide, bb    #Hypothyroidism  -levothyroxine     Dispo: medically stable for discharge    MD Demarcus Alvarado Hospitalist  [phone]

## 2024-08-08 NOTE — PLAN OF CARE
A&Ox4. VSS. On room air. . IS encouraged. Teds and SCDs. Ankle pumps encouraged. Tolerating diet. Last BM 8/7. Voiding. Pain managed with PO medication. Surgical dressing to back C/D/I, gel ice in place. Ambulating with min assist with walker. Plan is to dc home today. Patient updated and in agreement with plan of care. Safety precautions in place. Instructed patient to call for assistance, call light within reach.

## 2024-08-08 NOTE — OCCUPATIONAL THERAPY NOTE
OCCUPATIONAL THERAPY TREATMENT NOTE - INPATIENT     Room Number: 372/372-A  Session: 1   Number of Visits to Meet Established Goals: 2    Presenting Problem: s/p L2-3, L3-4, L4-5 decompression, uninstrumented fusion, and repair of incidental durotomy 8/5/24  Prior Level of Function: Per pt daughter, pt is IND c ADLs/IADLs and lives at home alone, is retired, and was driving prior to this admission.    ASSESSMENT   Patient demonstrates excellent progress this session, goals updated to reflect patient performance.    Patient functions near baseline with  all ADLs .   Occupational Therapy will discharge patient at this time as all goals have been met at supervision.    Patient would benefit from home with HHOT at discharge.         OT Device Recommendations: TBD    History: Patient is a 84 year old female admitted on 8/5/2024 with Presenting Problem: s/p L2-3, L3-4, L4-5 decompression, uninstrumented fusion, and repair of incidental durotomy 8/5/24. Co-Morbidities : breast CA, HTN, s/p L TKA    WEIGHT BEARING RESTRICTION  Weight Bearing Restriction: None                Recommendations for nursing staff:   Transfers: 1 person  Toileting location: toilet    TREATMENT SESSION:  Patient Start of Session: seated in chair  FUNCTIONAL TRANSFER ASSESSMENT  Sit to Stand: Chair  Edge of Bed: Not Tested  Chair: Supervision    BED MOBILITY  Supine to Sit : Not tested  Scooting: NT    BALANCE ASSESSMENT  Static Sitting: Supervision  Static Standing: Supervision    FUNCTIONAL ADL ASSESSMENT  UB Dressing Seated: Modified Independent  LB Dressing Seated: Supervision  LB Dressing Standing: Supervision      ACTIVITY TOLERANCE: WFL                         O2 SATURATIONS       EDUCATION PROVIDED  Patient: Role of Occupational Therapy; Plan of Care; Discharge Recommendations; Functional Transfer Techniques; Fall Prevention; Surgical Precautions; Posture/Positioning; Energy Conservation; Proper Body Mechanics  Patient's Response to  Education: Verbalized Understanding; Returned Demonstration  Family/Caregiver: -- (Same)  Family/Caregiver's Response to Education: Verbalized Understanding      Equipment used: reacher  Demonstrates functional use     Therapist comments: Pt received seated in chair, pleasant and cooperative for OT session. Pt's daughter present at bedside. Pt with good carryover of spinal precautions in preparation for ADL management. LB dressing performed at mod I in sitting while adhering to spinal precautions and supervision to advance up past hips in standing. UB dressing performed at mod I. Pt reports having all necessary AE/DME at home for ADLs. No further questions at this time.   Patient End of Session: Up in chair;Needs met;Call light within reach;RN aware of session/findings;All patient questions and concerns addressed;Family present;Alarm set    SUBJECTIVE  Pt pleasant and cooperative for OT.    PAIN ASSESSMENT  Ratin  Location: denies  Management Techniques: Repositioning;Activity promotion;Body mechanics     OBJECTIVE  Precautions: Spine;Bed/chair alarm;Hard of hearing    AM-PAC ‘6-Clicks’ Inpatient Daily Activity Short Form  -   Putting on and taking off regular lower body clothing?: A Little  -   Bathing (including washing, rinsing, drying)?: A Little  -   Toileting, which includes using toilet, bedpan or urinal? : A Lot  -   Putting on and taking off regular upper body clothing?: A Little  -   Taking care of personal grooming such as brushing teeth?: A Little  -   Eating meals?: None    AM-PAC Score:  Score: 18  Approx Degree of Impairment: 46.65%  Standardized Score (AM-PAC Scale): 38.66    PLAN  OT Treatment Plan: Balance activities;Energy conservation/work simplification techniques;ADL training;IADL training;Functional transfer training;UE strengthening/ROM;Endurance training;Patient/Family education;Patient/Family training;Equipment eval/education;Compensatory technique education;Continued evaluation  Rehab  Potential : Good  Frequency: 3x/week    OT Goals:     All goals ongoing 08/08    ADL Goals   Patient will perform upper body dressing:  with supervision- goal met 08/08  Patient will perform lower body dressing:  with supervision and with adaptive equipment PRN- goal met 08/08  Patient will perform toileting: with supervision     Functional Transfer Goals  Patient will transfer from supine to sit:  with min assist  Patient will transfer from sit to stand:  with supervision- goal met 08/08  Patient will transfer to toilet:  with supervision        Therapist Goals  Pt will recall spinal precautions to promote proper healing during recovery.- goal met 08/08    OT Session Time: 25 minutes  Self-Care Home Management: 25 minutes

## 2024-08-08 NOTE — CM/SW NOTE
08/08/24 1100   CM/SW Referral Data   Referral Source Social Work (self-referral)   Reason for Referral Discharge planning   Informant Patient;Daughter;EMR;Clinical Staff Member   Patient Info   Patient's Current Mental Status at Time of Assessment Alert;Oriented   Patient lives with Alone       Sw met with pt and daughter to discuss dc planning.  PT/OT recs were for HHC yesterday.  Pt did well with PT today so HHC not needed.  Pt was not interested in HHC at dc so is happy that it is not needed.    Plan to dc home today.    Per PT:    HOME SITUATION  Type of Home: House   Home Layout: One level  Stairs to Enter : 1     Lives With: Alone  Drives: Yes  Patient Owned Equipment: Rolling walker;Cane  Patient Regularly Uses: Hearing aides     Prior Level of [place]: Pt lives alone at [facility]. Pt independent with ADL and mobility. Pt does not use RW or cane for ambulation. Pt enjoys participating in water aerobics. Pts 2 daughters will stay with her at discharge.     Iwona Glover LCSW  /Discharge Planner

## 2024-08-08 NOTE — PROGRESS NOTES
S: Patient with family member at bedside. She has controlled back pain. No new radicular pain. Baseline left leg heaviness and numbness. Drain out yesterday. BM yesterday. Reports no headaches.     Inspection:  Awake alert No acute distress. No difficulty breathing     Blood pressure 141/55, pulse 98, temperature 98.3 °F (36.8 °C), temperature source Oral, resp. rate 16, height 5' 4\" (1.626 m), weight 225 lb (102.1 kg), SpO2 94%, not currently breastfeeding.    Recent Labs   Lab 08/06/24  0526   HGB 9.1*   HCT 28.2*       Lumbar/Sacral Integument: Incision/ incisions;  Dressing in place,     Strength: Strength of bilateral lower extremities:     Left Right    EHL 5/5 5/5    DF 5/5 5/5    PF 5/5 5/5    Quads 5/5 5/5    IP 5/5 5/5  Sensation: No sensory deficits noted on bilateral lower extremities      HEAD/NECK: Head is normocephalic  EYES: EOMI, BAUDILIO  SKIN EXAM: Skin is intact, head, neck, trunk and arms/legs. No rashes, mottling or ulcerations.      A/P: POD # 3 s/p L2-5 decompression and uninstrumented fusion, repair of incidental durotomy     P: Patient is doing well. No headaches reported. Continue current management. Continue to work with PT and OT. If no positional headaches reported is cleared for discharge once cleared by all services. Case reviewed with Dr. Moreno who agrees with the above plan.     Idalmis Charles PA-C

## 2024-08-08 NOTE — PHYSICAL THERAPY NOTE
PHYSICAL THERAPY TREATMENT NOTE - INPATIENT    Room Number: 372/372-A     Session: 1     Number of Visits to Meet Established Goals: 3    Presenting Problem: s/p L2-5 decompression and uninstrumented fusion with repair of incidental durotomy 24  Co-Morbidities : breast CA, HTN, s/p L TKA    ASSESSMENT   Patient demonstrates good  progress this session, goals met.    Patient is currently functioning near baseline with bed mobility, transfers, and gait.    Patient currently does not meet criteria for skilled inpatient physical therapy services, however patient will continue to benefit from QID ambulation with nursing staff.  Pt is discharged from IP PT services.  RN aware to re-order if there is a decline in mobility status.      PLAN  PT Treatment Plan: Bed mobility;Endurance;Energy conservation;Patient education;Gait training;Strengthening;Balance training;Transfer training  Rehab Potential : Good  Frequency (Obs): Daily    CURRENT GOALS  Goal #1  Patient is able to demonstrate supine - sit EOB @ level: supervision      Goal #2  Patient is able to demonstrate transfers Sit to/from Stand at assistance level: supervision   Goal #3     Patient is able to ambulate 150 feet with assistive device at assistance level: supervision   Goal #4     Patient will negotiate one curb w/ assistive device and supervision   Goal #5     Patient verbalizes and/or demonstrates all precautions and safety concerns independently    Goal #6        Goal Comments: Goals established on 2024  SUBJECTIVE  \"I feel good today!\"     OBJECTIVE  Precautions: Spine;Bed/chair alarm;Hard of hearing    WEIGHT BEARING RESTRICTION  Weight Bearing Restriction: None                PAIN ASSESSMENT   Ratin  Location: back  Management Techniques: Activity promotion;Repositioning    BALANCE                                                                                                                       Static Sitting: Good  Dynamic Sitting:  Good           Static Standing: Fair  Dynamic Standing: Fair -    ACTIVITY TOLERANCE                         O2 WALK         AM-PAC '6-Clicks' INPATIENT SHORT FORM - BASIC MOBILITY  How much difficulty does the patient currently have...  Patient Difficulty: Turning over in bed (including adjusting bedclothes, sheets and blankets)?: A Little   Patient Difficulty: Sitting down on and standing up from a chair with arms (e.g., wheelchair, bedside commode, etc.): A Little   Patient Difficulty: Moving from lying on back to sitting on the side of the bed?: A Little   How much help from another person does the patient currently need...   Help from Another: Moving to and from a bed to a chair (including a wheelchair)?: A Little   Help from Another: Need to walk in hospital room?: A Little   Help from Another: Climbing 3-5 steps with a railing?: A Little       AM-PAC Score:  Raw Score: 18   Approx Degree of Impairment: 46.58%   Standardized Score (AM-PAC Scale): 43.63   CMS Modifier (G-Code): CK    FUNCTIONAL ABILITY STATUS  Gait Assessment   Functional Mobility/Gait Assessment  Gait Assistance: Supervision  Distance (ft): 300  Assistive Device: Rolling walker    Skilled Therapy Provided  Sit-stand to RW with supervision.   Ambulatory with steady step through gait pattern using RW.   Demos good pacing and safety with RW.   Returned to room and reviewed log roll.   Performed bed mobility with supervision.   VC for technique.   Returned to sitting up in bedside chair.     Bed Mobility:  Rolling: supervision   Supine<>Sit: supervision   Sit<>Supine: supervision     Transfer Mobility:  Sit<>Stand: supervision   Stand<>Sit: supervision   Gait: supervision    Therapist's Comments:  Reviewed log roll, spine precautions, and activity recommendations.     Patient End of Session: Up in chair;Needs met;Call light within reach;RN aware of session/findings;All patient questions and concerns addressed    PT Session Time: 25 minutes  Gait  Training: 10 minutes  Therapeutic Activity: 10 minutes  Therapeutic Exercise:  minutes   Neuromuscular Re-education:  minutes

## 2024-08-08 NOTE — PLAN OF CARE
Patient A & O x4. VSS, on RA. C/o mild pain, denies need for pain medications. Dressing to back is clean dry and intact. Gel ice wrap applied. Up min assist. Voiding freely. Safety measures in place. Instructed to use call light.

## 2024-08-08 NOTE — DISCHARGE INSTRUCTIONS
Lumbar Spinal Fusion Discharge Instructions (Dr. Moreno)    Activity  Restrictions  No twisting, pulling, pushing or lifting > 10 lbs.  No lifting anything over your head.  No bending at the waist especially if braced - you can bend at the knees but keep your back straight.    Sitting  Sit with your knees at about the same level as your hips; use a footstool if needed.  Do not sit in soft or overstuffed chairs. Firm chairs with straight backs give better support.   Recliners are OK but may need to add pillows in order to not sink into the chair.  Use a raised toilet seat if needed.  Change position every 20-30 minutes when sitting (example: after sitting, stand, then you can sit again for another 20-30 minutes).    Walking is your best exercise.  Listen to your body.  Walk several times a day  Smaller distances are better.  Your goal is to increase the distance you walk each day.  Remember to listen to your body    Sex  After 2-3 weeks, when comfortable and approved by your surgeon.  Stop if causing pain.  Check with surgeon for more information.    Driving  Usually allowed after  2-3 weeks;  check with physician at first office visit.  Do Not drive while taking narcotics or muscle relaxants.  Adhere to sitting restrictions.    Stairs  Climb stairs as needed      IF you have a Brace / Corset  Use when out of bed except when showering.  May wear even when toileting.  Anticipate wearing for 6-12 weeks after surgery; exact time to be determined by surgeon. Discuss at office visit.  Put brace on:   when sitting/standing at side of bed            Incision site care and dressing  Changes as directed by your surgeon    Always wash hands before and after dressing changes.     DRY GAUZE DRESSING  Change dressing daily using dry sterile gauze and paper tape.  Watch incision for any redness, drainage, increased warmth or opening of the incision.   Call surgeon if you notice any of these.  No lotions or ointments on or near  incision.  Steri-strips may be under dressing; these will gradually peel off  by themselves.  Any remaining steri-strips or sutures will be removed at your post op office visit                              Bathing  No tub baths or pools for 6 weeks and until cleared by surgeon.  Check with your surgeon for specific bathing/showering restrictions.  Do not shower until there is no drainage from the incision. Drainage usually stops within 3-4 days after surgery.  When allowed to shower, keep the incision as dry as possible.  Cover gauze dressing with plastic.  After showering, remove old dressing, pat incision dry and apply new dry dressing.  Do not apply any lotions or ointments to incision  Showering okay without brace                    Return to work  When cleared by surgeon. Discuss specific work activities with your surgeon.    Sleeping  Use a firm mattress.  Lay on side or back, not stomach.  May use pillow under knees, between legs or behind back if lying on your side.  Log roll to turn.    Jan Hose  Wear until walking.  May take off at night and for bathing. Hand wash with mild soap; line dry.    Diet and constipation prevention  Drink six to eight glasses liquid (water, juice) per day.  Eat a high fiber diet (bran, vegetables, fruit).  Use an over the counter stool softener such as Colace or senakot while taking narcotics to prevent constipation; use laxatives such as Miralax or Milk of Magnesia as needed.  An enema or suppository may be needed if above measures do not work.    No smoking  Smoking will inhibit the spine from healing with a solid fusion.  Even one cigarette a day will cause problems.  Chewing tobacco, nicotine gum or patches will also inhibit bone healing.          Pain Management  Relaxation techniques  A way to focus your attention other than on your pain. Your brain makes endorphins that are a natural body chemical that can decrease pain. Some examples of relaxation techniques are deep  breathing, listening to music or meditating.  Use Cold therapy (polar care) for 20-30 minutes multiple times per day.  Be sure there is a cloth barrier between skin and polar care.  Medication  No NSAIDS until OK with your surgeon.   NSAIDS (non-steroidal anti- inflammatory) include: Motrin, ibuprofen, Advil, Aleve, Naprosyn, Indocin, Nuprin, Vioxx, Celebrex, Bextra.(COMPLETE LIST IN GUIDEBOOK APPENDIX)  Tylenol (acetaminophen) is okay. Caution if your pain med contains Tylenol (acetaminophen); maximum 3 gms of Tylenol(acetaminophen) in 24 hours.    A single aspirin for the heart is ok if ordered by your physician.  Take muscle relaxants and pain medications as prescribed allowing 30-45 minutes to take effect.  Do NOT take alcohol while on pain medication.  Monitor need for pain medication closely  Call pharmacy or physician office at least five days before out of pain medication. If calling physician office after 3 pm, it will be handled on the next business day.    Post op office visit  Schedule 2 weeks after surgery with surgeon as directed at discharge  Schedule one week follow up with Primary Care Physician. Review all medications.      When to contact your surgeon  Temp > 101F; Take your temperature twice a day  Increased pain, swelling, redness, or any drainage to incision.  Separation of incision..  Sudden reappearance of pain that won’t go away with pain medication.  New numbness or weakness to arms or legs.  Difficulty urinating or having bowel movements  Severe headaches.    Go directly to the ER or CALL 911 if you:  become short of breath  have chest pain  cough up blood  have unexplained anxiety with breathing

## 2025-04-24 ENCOUNTER — HOSPITAL ENCOUNTER (EMERGENCY)
Facility: HOSPITAL | Age: 85
Discharge: HOME | End: 2025-04-24
Attending: EMERGENCY MEDICINE
Payer: MEDICARE

## 2025-04-24 ENCOUNTER — APPOINTMENT (OUTPATIENT)
Dept: GENERAL RADIOLOGY | Facility: HOSPITAL | Age: 85
End: 2025-04-24
Attending: EMERGENCY MEDICINE
Payer: MEDICARE

## 2025-04-24 VITALS
BODY MASS INDEX: 37.56 KG/M2 | SYSTOLIC BLOOD PRESSURE: 127 MMHG | HEART RATE: 68 BPM | RESPIRATION RATE: 20 BRPM | DIASTOLIC BLOOD PRESSURE: 67 MMHG | OXYGEN SATURATION: 96 % | WEIGHT: 220 LBS | HEIGHT: 64 IN

## 2025-04-24 DIAGNOSIS — I48.92 UNCONTROLLED ATRIAL FLUTTER (HCC): Primary | ICD-10-CM

## 2025-04-24 DIAGNOSIS — I48.92 ATRIAL FLUTTER, PAROXYSMAL (HCC): ICD-10-CM

## 2025-04-24 LAB
ALBUMIN SERPL-MCNC: 4.8 G/DL (ref 3.2–4.8)
ALBUMIN/GLOB SERPL: 1.9 {RATIO} (ref 1–2)
ALP LIVER SERPL-CCNC: 112 U/L (ref 55–142)
ALT SERPL-CCNC: 17 U/L (ref 10–49)
ANION GAP SERPL CALC-SCNC: 11 MMOL/L (ref 0–18)
AST SERPL-CCNC: 26 U/L (ref ?–34)
BASOPHILS # BLD AUTO: 0.04 X10(3) UL (ref 0–0.2)
BASOPHILS NFR BLD AUTO: 0.4 %
BILIRUB SERPL-MCNC: 0.9 MG/DL (ref 0.2–1.1)
BUN BLD-MCNC: 27 MG/DL (ref 9–23)
CALCIUM BLD-MCNC: 10.6 MG/DL (ref 8.7–10.6)
CHLORIDE SERPL-SCNC: 102 MMOL/L (ref 98–112)
CO2 SERPL-SCNC: 28 MMOL/L (ref 21–32)
CREAT BLD-MCNC: 1.38 MG/DL (ref 0.55–1.02)
EGFRCR SERPLBLD CKD-EPI 2021: 38 ML/MIN/1.73M2 (ref 60–?)
EOSINOPHIL # BLD AUTO: 0.13 X10(3) UL (ref 0–0.7)
EOSINOPHIL NFR BLD AUTO: 1.2 %
ERYTHROCYTE [DISTWIDTH] IN BLOOD BY AUTOMATED COUNT: 14.6 %
GLOBULIN PLAS-MCNC: 2.5 G/DL (ref 2–3.5)
GLUCOSE BLD-MCNC: 104 MG/DL (ref 70–99)
HCT VFR BLD AUTO: 48.1 % (ref 35–48)
HGB BLD-MCNC: 15.5 G/DL (ref 12–16)
IMM GRANULOCYTES # BLD AUTO: 0.07 X10(3) UL (ref 0–1)
IMM GRANULOCYTES NFR BLD: 0.6 %
LYMPHOCYTES # BLD AUTO: 1.83 X10(3) UL (ref 1–4)
LYMPHOCYTES NFR BLD AUTO: 16.5 %
MCH RBC QN AUTO: 28.4 PG (ref 26–34)
MCHC RBC AUTO-ENTMCNC: 32.2 G/DL (ref 31–37)
MCV RBC AUTO: 88.1 FL (ref 80–100)
MONOCYTES # BLD AUTO: 1.11 X10(3) UL (ref 0.1–1)
MONOCYTES NFR BLD AUTO: 10 %
NEUTROPHILS # BLD AUTO: 7.92 X10 (3) UL (ref 1.5–7.7)
NEUTROPHILS # BLD AUTO: 7.92 X10(3) UL (ref 1.5–7.7)
NEUTROPHILS NFR BLD AUTO: 71.3 %
OSMOLALITY SERPL CALC.SUM OF ELEC: 297 MOSM/KG (ref 275–295)
PLATELET # BLD AUTO: 233 10(3)UL (ref 150–450)
POTASSIUM SERPL-SCNC: 4.1 MMOL/L (ref 3.5–5.1)
PROT SERPL-MCNC: 7.3 G/DL (ref 5.7–8.2)
RBC # BLD AUTO: 5.46 X10(6)UL (ref 3.8–5.3)
SODIUM SERPL-SCNC: 141 MMOL/L (ref 136–145)
TROPONIN I SERPL HS-MCNC: 12 NG/L (ref ?–34)
WBC # BLD AUTO: 11.1 X10(3) UL (ref 4–11)

## 2025-04-24 PROCEDURE — 93010 ELECTROCARDIOGRAM REPORT: CPT

## 2025-04-24 PROCEDURE — 71045 X-RAY EXAM CHEST 1 VIEW: CPT | Performed by: EMERGENCY MEDICINE

## 2025-04-24 PROCEDURE — 80053 COMPREHEN METABOLIC PANEL: CPT | Performed by: EMERGENCY MEDICINE

## 2025-04-24 PROCEDURE — 96366 THER/PROPH/DIAG IV INF ADDON: CPT

## 2025-04-24 PROCEDURE — 99285 EMERGENCY DEPT VISIT HI MDM: CPT

## 2025-04-24 PROCEDURE — 93005 ELECTROCARDIOGRAM TRACING: CPT

## 2025-04-24 PROCEDURE — 96365 THER/PROPH/DIAG IV INF INIT: CPT

## 2025-04-24 PROCEDURE — 84484 ASSAY OF TROPONIN QUANT: CPT | Performed by: EMERGENCY MEDICINE

## 2025-04-24 PROCEDURE — 85025 COMPLETE CBC W/AUTO DIFF WBC: CPT | Performed by: EMERGENCY MEDICINE

## 2025-04-24 RX ORDER — DILTIAZEM HYDROCHLORIDE 5 MG/ML
20 INJECTION INTRAVENOUS ONCE
Status: COMPLETED | OUTPATIENT
Start: 2025-04-24 | End: 2025-04-24

## 2025-04-24 NOTE — CONSULTS
[facility] Cardiology  Consultation Note      Maritza Cantu Patient Status:  Emergency    1940 MRN [id number]   Location [place] EMERGENCY DEPARTMENT Attending No att. providers found   Hosp Day # 0 PCP Shauna Baez MD     Reason for consult: Atrial flutter, palpitations    Primary cardiologist: Jacob Romero MD     History of Present Illness:  Maritza Cantu is a 85 year old female with paroxysmal atrial flutter on Eliquis who presented to [facility] on 2025 with palpitations. Felt some discomfort in her b/l jaw this morning. Noted HR to be fast and came to ER. Here in atrial flutter with 2:1 conduction HR ~ 130s. Was started on diltiazem IV and spontaneously converted to SR. Feels back to normal. There are no reports of chest pain, dyspnea, leg swelling, orthopnea, PND, lightheadedness, syncope, claudication, stroke/TIA symptoms, or any outward signs of bleeding.          Medications:  Current Hospital Medications[1]    Past Medical History[2]    Past Surgical History[3]    Family History  family history includes Breast Cancer (age of onset: 46) in her sister; Breast Cancer (age of onset: 48) in her cousin; Breast Cancer (age of onset: 60) in her maternal aunt; Heart Disorder in her father; Hypertension in her father; Lipids in her father; Other in her daughter, daughter, daughter, and sister.    Social History   reports that she has never smoked. She has never used smokeless tobacco. She reports current alcohol use. She reports that she does not use drugs.     Allergies  Allergies[4]    Review of Systems:  As per HPI, otherwise 10 point ROS is negative in detail.    Physical Exam:  Blood pressure 127/67, pulse 68, resp. rate 20, height 64\", weight 220 lb (99.8 kg), SpO2 96%, not currently breastfeeding.  No data recorded.    Wt Readings from Last 3 Encounters:   25 220 lb (99.8 kg)   24 225 lb (102.1 kg)   24 220 lb (99.8 kg)       General: Awake and alert;  in no acute distress  HEENT: Extraocular movements are intact; sclerae are anicteric; scalp is atraumatic  Neck: Supple; no JVD; no carotid bruits  Cardiac: Regular rate and regular rhythm; normal S1 and S2, no murmurs, rubs, or gallops are appreciated  Lungs: Clear to auscultation bilaterally; no accessory muscle use is noted, no wheezes, rhonci or rales  Abdomen: Soft, non-distended, non-tender; bowel sounds are normoactive  Extremities: Warm, no edema, clubbing or cyanosis; moves all 4 extremities normally, distal pulses intact and equal  Psychiatric: Normal mood and affect; answers questions appropriately  Dermatologic: No rashes; normal skin turgor    Diagnostic testing:    Labs:   Lab Results   Component Value Date    INR 1.0 02/06/2020    INR 0.94 05/23/2018        Lab Results   Component Value Date    WBC 11.1 04/24/2025    HGB 15.5 04/24/2025    HCT 48.1 04/24/2025    .0 04/24/2025    CREATSERUM 1.38 04/24/2025    BUN 27 04/24/2025     04/24/2025    K 4.1 04/24/2025     04/24/2025    CO2 28.0 04/24/2025     04/24/2025    CA 10.6 04/24/2025    ALB 4.8 04/24/2025    ALKPHO 112 04/24/2025    BILT 0.9 04/24/2025    TP 7.3 04/24/2025    AST 26 04/24/2025    ALT 17 04/24/2025       Cardiac diagnostics:    EKG 4/24/2025: Atrial flutter with 2:1 conduction -> SR    Echo 8/2023:  1. Left ventricle: The cavity size is normal. Wall thickness is normal.      Systolic function is normal by visual assessment. The estimated ejection      fraction is 60%. Wall motion is normal; there are no regional wall motion      abnormalities. Left ventricular diastolic function parameters are normal.   2. Mitral valve: The annulus is mildly calcified. There is mild      regurgitation.   3. Right ventricle: The cavity size is normal. Wall thickness is normal.      Systolic function is normal by visual assessment. Estimation of the right      ventricular systolic pressure is at the upper limits of normal. The       estimated peak pressure is 30mm Hg. The RV pressure during systole is      30mm Hg.   4. Pericardium, extracardiac: There is no significant pericardial effusion.   Impressions:  Prior study date: 07/31/2017.     Impression:  85 year old female presenting with palpitations  Paroxysmal atrial flutter, dx'd 2016  CHADS VASC 4, on eliquis  HTN  HLD  Hypothyroid  CKD 3    Recommendations:  Symptoms resolved, back in SR rate 60s. Plan for dc home. Usual meds, including Eliquis and metoprolol 50mg BID. Advised to call office and schedule f/u with Dr. Romero or myself or APN within 2-4 weeks. Discussed consideration for ablation given symptomatic recurrence.     Thank you for allowing our practice to participate in the care of your patient. Please do not hesitate to contact me if you have any questions.    Santhosh Guzman MD  Interventional Cardiology  [facility]  Office: [phone]         [1]   Current Facility-Administered Medications   Medication Dose Route Frequency    dilTIAZem (cardIZEM) 100 mg in sodium chloride 0.9% 100 mL IVPB-ADDV  5 mg/hr Intravenous Continuous   [2]   Past Medical History:   Arrhythmia    SVT AND ATRIAL FLUTTER    Back problem    Cancer (HCC)    Breast cancer    Colon adenoma    Compression of lumbar nerve root    Degenerative lumbar spinal stenosis    Diaphragmatic hernia without mention of obstruction or gangrene    Difficult intubation    Disorder of thyroid    Esophageal reflux    Exposure to medical diagnostic radiation    Last treatment 01/2024    Hearing impairment    Roman HA's    High blood pressure    History of stomach ulcers    A FEW YEARS AGO    Hypothyroidism    Mixed hyperlipidemia    OBESITY    Osteoarthrosis, unspecified whether generalized or localized, unspecified site    Osteopenia    TINNITUS NOS    Unspecified vitamin D deficiency    Visual impairment    glasses   [3]   Past Surgical History:  Procedure Laterality Date    Arthroscopy of joint unlisted Right      knee    Colonoscopy  9/2003    normal    Colonoscopy  10/6/11 - [facility]    adenoma, hemorrhoids. repeat 2021.    Colonoscopy,diagnostic  11/17/16    normal    Excisional biospy right  1995    benign    Hernia surgery  1985    inguinal    Hysterectomy  1985    with BSO    Repair rotator cuff,acute Right 6/2014    Tonsillectomy      removed in childhood around age 12    Upper gi endoscopy - referral  7/9/12 - [facility]    small gastric erosion, no etiology for bleeding identifiedesophageal and gastric bx negative    Upper gi endoscopy,diagnosis  9/2003    hiatal hernia   [4]   Allergies  Allergen Reactions    Erythromycin Base NAUSEA AND VOMITING    Talwin [Pentazocine Lactate] NAUSEA AND VOMITING     CRAMP    Levofloxacin [Quixin]      Pt does want to take due to possible side effects     Daypro [Oxaprozin] NAUSEA ONLY    Tetracycline Base RASH

## 2025-04-24 NOTE — DISCHARGE INSTRUCTIONS
Continue your Eliquis as well as metoprolol.  Can take an extra metoprolol if heart rate goes over 100.  Follow-up with cardiology within the week.  Return if recurrent symptoms, new complaints

## 2025-04-24 NOTE — ED PROVIDER NOTES
Patient Seen in: [facility] Emergency Department      History     Chief Complaint   Patient presents with    Arrythmia/Palpitations     Stated Complaint:     Subjective:   HPI    Patient is a very pleasant 85-year-old female who presents with 3 to 4 hours of palpitations and fast heartbeat.  Has a history of a flutter.  She is on Eliquis as well as metoprolol which she has been taking.  She has an Apple Watch.  Heart rate was elevated.  She had palpitations.  She had minimal left jaw pain lasting briefly.  That is resolved.  No other specific complaints.  Patient is otherwise at her medical baseline.  She sees Dr. Romero for cardiology.  History of Present Illness               Objective:     Past Medical History:    Arrhythmia    SVT AND ATRIAL FLUTTER    Back problem    Cancer (HCC)    Breast cancer    Colon adenoma    Compression of lumbar nerve root    Degenerative lumbar spinal stenosis    Diaphragmatic hernia without mention of obstruction or gangrene    Difficult intubation    Disorder of thyroid    Esophageal reflux    Exposure to medical diagnostic radiation    Last treatment 01/2024    Hearing impairment    Roman HA's    High blood pressure    History of stomach ulcers    A FEW YEARS AGO    Hypothyroidism    Mixed hyperlipidemia    OBESITY    Osteoarthrosis, unspecified whether generalized or localized, unspecified site    Osteopenia    TINNITUS NOS    Unspecified vitamin D deficiency    Visual impairment    glasses              Past Surgical History:   Procedure Laterality Date    Arthroscopy of joint unlisted Right     knee    Colonoscopy  9/2003    normal    Colonoscopy  10/6/11 - [facility]    adenoma, hemorrhoids. repeat 2021.    Colonoscopy,diagnostic  11/17/16    normal    Excisional biospy right  1995    benign    Hernia surgery  1985    inguinal    Hysterectomy  1985    with BSO    Repair rotator cuff,acute Right 6/2014    Tonsillectomy      removed in childhood around age 12    Upper gi  endoscopy - referral  7/9/12 - [facility]    small gastric erosion, no etiology for bleeding identifiedesophageal and gastric bx negative    Upper gi endoscopy,diagnosis  9/2003    hiatal hernia                Social History     Socioeconomic History    Marital status:    Tobacco Use    Smoking status: Never    Smokeless tobacco: Never   Vaping Use    Vaping status: Never Used   Substance and Sexual Activity    Alcohol use: Yes     Comment: 1-3 a week    Drug use: No    Sexual activity: Never   Social History Narrative    : 1962    Children: 4    Exercise: walks water and dance aerobics    Employment: retired real estate    Caffeine intake: minimal    Went to  with [employer], class 1960     Social Drivers of Health     Food Insecurity: No Food Insecurity (8/5/2024)    Food Insecurity     Food Insecurity: Never true   Transportation Needs: No Transportation Needs (8/5/2024)    Transportation Needs     Lack of Transportation: No   Housing Stability: Low Risk  (8/5/2024)    Housing Stability     Housing Instability: No                                Physical Exam     ED Triage Vitals [04/24/25 1217]   BP (!) 141/102   Pulse (!) 127   Resp (!) 28   Temp    Temp src    SpO2 99 %   O2 Device None (Room air)       Current Vitals:   Vital Signs  BP: 127/67  Pulse: 68  Resp: 20  MAP (mmHg): 85    Oxygen Therapy  SpO2: 96 %  O2 Device: None (Room air)        Physical Exam  General: Patient is resting comfortably in no acute distress  HEENT: Normal cephalic atraumatic.  Nonicteric sclera.  Moist mucous membranes.  No meningismus.  No adenopathy  Lungs: No tachypnea.  Lungs clear to auscultation bilaterally without rales/rhonchi.  Equal breath sounds bilaterally  Cardiac: Tachycardic, regular abdomen: Soft and nontender throughout.  No rebound or guarding  Extremities: No clubbing/cyanosis/edema.  Skin: No rashes, no pallor  Neuro: Awake oriented ×3.  Nonfocal.  Good strength throughout  Physical  Exam                ED Course     Labs Reviewed   COMP METABOLIC PANEL (14) - Abnormal; Notable for the following components:       Result Value    Glucose 104 (*)     BUN 27 (*)     Creatinine 1.38 (*)     Calculated Osmolality 297 (*)     eGFR-Cr 38 (*)     All other components within normal limits   CBC WITH DIFFERENTIAL WITH PLATELET - Abnormal; Notable for the following components:    WBC 11.1 (*)     RBC 5.46 (*)     HCT 48.1 (*)     Neutrophil Absolute Prelim 7.92 (*)     Neutrophil Absolute 7.92 (*)     Monocyte Absolute 1.11 (*)     All other components within normal limits   TROPONIN I HIGH SENSITIVITY - Normal   RAINBOW DRAW LAVENDER   RAINBOW DRAW LIGHT GREEN   RAINBOW DRAW BLUE     EKG    Rate, intervals and axes as noted on EKG Report.  Rate: 128  Rhythm: Atrial flutter  Reading: Atrial flutter.  Flutter waves seen.  Nonspecific ST-T wave changes.  I disagree with acute MI as I believe this to be flutter waves.  Axis/intervals are noted.  Otherwise, agree with EKG report         EKG #2 ventricular rate of 91.  Atrial flutter with variable AV block.  No acute ST-T wave changes.  Axis/intervals are noted but otherwise, agree with EKG report   EKG #3 ventricular rate of 69.  Sinus rhythm with first-degree AV block.  No changes versus an old EKG.  Axis intervals are noted.  Otherwise, I agree with EKG result  Results            Chest x-ray: I personally reviewed the films and my independent interpertaion showed no acute pathology.  Official report reviewed.           Blood work reviewed.  Creatinine 1.38 essentially baseline.  White count 11.1.  Hemoglobin 15.  Troponin 12 despite several hours of symptoms.          MDM      Patient presents with recurrence of a flutter with a rapid ventricular rate.  Symptomatic with palpitations and mild dizziness.  Brief jaw pain that is resolved.  Patient treated with IV diltiazem 20 mg bolus with a drip.  Heart rate improved.  Initial plan was to admit to the hospital  for further care and treatment.  Dr. Guzman evaluated the patient in the ER.  However patient spontaneously converted back to normal sinus rhythm.  She is asymptomatic currently.  Will continue Eliquis as well as her beta-blocker.  Will have her follow-up with cardiology as an outpatient.  Return if recurrent symptoms, new complaints.    Admission disposition: 4/24/2025  1:51 PM           Medical Decision Making      Disposition and Plan     Clinical Impression:  1. Uncontrolled atrial flutter (HCC)         Disposition:  Admit  4/24/2025  1:51 pm    Follow-up:  No follow-up provider specified.        Medications Prescribed:  Current Discharge Medication List          Supplementary Documentation:         Hospital Problems       Present on Admission  Date Reviewed: 11/17/2021          ICD-10-CM Noted POA    * (Principal) Uncontrolled atrial flutter (HCC) I48.92 4/24/2025 Unknown

## 2025-04-24 NOTE — ED QUICK NOTES
Pt's IV was removed, intact and then bandaged  Pt, then given discharge instructions  Pt, understood all instructions and had no other questions or concerns

## 2025-04-24 NOTE — PROGRESS NOTES
Orders for admission, patient is aware of plan and ready to go upstairs. Any questions, please call ED RN Rogelio  at extension [phone].     Patient Covid vaccination status: Fully vaccinated     COVID Test Ordered in ED: None    COVID Suspicion at Admission: N/A    Running Infusions: Medication Infusions[1]     Mental Status/LOC at time of transport: A&OX4    Other pertinent information:   CIWA score: N/A   NIH score:  0             [1]    dilTIAZem 5 mg/hr (04/24/25 1162)

## 2025-04-24 NOTE — ED QUICK NOTES
Pt, came in today because her elevated HR   Pt, is on blood thinners  Pt, placed on continuous monitors  X-ray at the bedside  Pt, given medication. (Please see MAR)

## 2025-04-25 LAB
ATRIAL RATE: 128 BPM
ATRIAL RATE: 250 BPM
ATRIAL RATE: 69 BPM
P AXIS: 269 DEGREES
P AXIS: 54 DEGREES
P AXIS: 95 DEGREES
P-R INTERVAL: 172 MS
P-R INTERVAL: 224 MS
Q-T INTERVAL: 292 MS
Q-T INTERVAL: 338 MS
Q-T INTERVAL: 390 MS
QRS DURATION: 70 MS
QRS DURATION: 72 MS
QRS DURATION: 84 MS
QTC CALCULATION (BEZET): 415 MS
QTC CALCULATION (BEZET): 417 MS
QTC CALCULATION (BEZET): 426 MS
R AXIS: 15 DEGREES
R AXIS: 17 DEGREES
R AXIS: 28 DEGREES
T AXIS: 28 DEGREES
T AXIS: 35 DEGREES
T AXIS: 41 DEGREES
VENTRICULAR RATE: 128 BPM
VENTRICULAR RATE: 69 BPM
VENTRICULAR RATE: 91 BPM

## 2025-05-10 ENCOUNTER — HOSPITAL ENCOUNTER (INPATIENT)
Facility: HOSPITAL | Age: 85
LOS: 2 days | Discharge: HOME | End: 2025-05-12
Attending: STUDENT IN AN ORGANIZED HEALTH CARE EDUCATION/TRAINING PROGRAM | Admitting: STUDENT IN AN ORGANIZED HEALTH CARE EDUCATION/TRAINING PROGRAM
Payer: MEDICARE

## 2025-05-10 DIAGNOSIS — I10 ESSENTIAL HYPERTENSION: ICD-10-CM

## 2025-05-10 DIAGNOSIS — R68.84 JAW PAIN: ICD-10-CM

## 2025-05-10 DIAGNOSIS — I48.91 ATRIAL FIBRILLATION WITH RAPID VENTRICULAR RESPONSE (HCC): Primary | ICD-10-CM

## 2025-05-10 DIAGNOSIS — R00.2 PALPITATIONS: ICD-10-CM

## 2025-05-10 LAB
ALBUMIN SERPL-MCNC: 4.6 G/DL (ref 3.2–4.8)
ALBUMIN/GLOB SERPL: 1.9 {RATIO} (ref 1–2)
ALP LIVER SERPL-CCNC: 112 U/L (ref 55–142)
ALT SERPL-CCNC: 20 U/L (ref 10–49)
ANION GAP SERPL CALC-SCNC: 9 MMOL/L (ref 0–18)
AST SERPL-CCNC: 26 U/L (ref ?–34)
BASOPHILS # BLD AUTO: 0.03 X10(3) UL (ref 0–0.2)
BASOPHILS NFR BLD AUTO: 0.3 %
BILIRUB SERPL-MCNC: 0.4 MG/DL (ref 0.2–1.1)
BUN BLD-MCNC: 34 MG/DL (ref 9–23)
CALCIUM BLD-MCNC: 9.7 MG/DL (ref 8.7–10.6)
CHLORIDE SERPL-SCNC: 104 MMOL/L (ref 98–112)
CO2 SERPL-SCNC: 27 MMOL/L (ref 21–32)
CREAT BLD-MCNC: 1.65 MG/DL (ref 0.55–1.02)
EGFRCR SERPLBLD CKD-EPI 2021: 31 ML/MIN/1.73M2 (ref 60–?)
EOSINOPHIL # BLD AUTO: 0.04 X10(3) UL (ref 0–0.7)
EOSINOPHIL NFR BLD AUTO: 0.4 %
ERYTHROCYTE [DISTWIDTH] IN BLOOD BY AUTOMATED COUNT: 14.6 %
GLOBULIN PLAS-MCNC: 2.4 G/DL (ref 2–3.5)
GLUCOSE BLD-MCNC: 216 MG/DL (ref 70–99)
HCT VFR BLD AUTO: 46.1 % (ref 35–48)
HGB BLD-MCNC: 15.2 G/DL (ref 12–16)
IMM GRANULOCYTES # BLD AUTO: 0.09 X10(3) UL (ref 0–1)
IMM GRANULOCYTES NFR BLD: 0.9 %
LYMPHOCYTES # BLD AUTO: 1.2 X10(3) UL (ref 1–4)
LYMPHOCYTES NFR BLD AUTO: 12 %
MCH RBC QN AUTO: 28.7 PG (ref 26–34)
MCHC RBC AUTO-ENTMCNC: 33 G/DL (ref 31–37)
MCV RBC AUTO: 87 FL (ref 80–100)
MONOCYTES # BLD AUTO: 0.87 X10(3) UL (ref 0.1–1)
MONOCYTES NFR BLD AUTO: 8.7 %
NEUTROPHILS # BLD AUTO: 7.79 X10 (3) UL (ref 1.5–7.7)
NEUTROPHILS # BLD AUTO: 7.79 X10(3) UL (ref 1.5–7.7)
NEUTROPHILS NFR BLD AUTO: 77.7 %
OSMOLALITY SERPL CALC.SUM OF ELEC: 304 MOSM/KG (ref 275–295)
PLATELET # BLD AUTO: 216 10(3)UL (ref 150–450)
POTASSIUM SERPL-SCNC: 4.1 MMOL/L (ref 3.5–5.1)
PROT SERPL-MCNC: 7 G/DL (ref 5.7–8.2)
RBC # BLD AUTO: 5.3 X10(6)UL (ref 3.8–5.3)
SODIUM SERPL-SCNC: 140 MMOL/L (ref 136–145)
TROPONIN I SERPL HS-MCNC: 11 NG/L (ref ?–34)
WBC # BLD AUTO: 10 X10(3) UL (ref 4–11)

## 2025-05-10 PROCEDURE — 85025 COMPLETE CBC W/AUTO DIFF WBC: CPT | Performed by: STUDENT IN AN ORGANIZED HEALTH CARE EDUCATION/TRAINING PROGRAM

## 2025-05-10 PROCEDURE — 96360 HYDRATION IV INFUSION INIT: CPT

## 2025-05-10 PROCEDURE — 84484 ASSAY OF TROPONIN QUANT: CPT | Performed by: STUDENT IN AN ORGANIZED HEALTH CARE EDUCATION/TRAINING PROGRAM

## 2025-05-10 PROCEDURE — 93005 ELECTROCARDIOGRAM TRACING: CPT

## 2025-05-10 PROCEDURE — 93010 ELECTROCARDIOGRAM REPORT: CPT

## 2025-05-10 PROCEDURE — 80053 COMPREHEN METABOLIC PANEL: CPT | Performed by: STUDENT IN AN ORGANIZED HEALTH CARE EDUCATION/TRAINING PROGRAM

## 2025-05-10 PROCEDURE — 99285 EMERGENCY DEPT VISIT HI MDM: CPT

## 2025-05-10 RX ORDER — ANASTROZOLE 1 MG/1
1 TABLET ORAL DAILY
COMMUNITY

## 2025-05-10 RX ORDER — NITROGLYCERIN 0.4 MG/1
0.4 TABLET SUBLINGUAL ONCE
Status: COMPLETED | OUTPATIENT
Start: 2025-05-10 | End: 2025-05-10

## 2025-05-10 RX ORDER — ASCORBIC ACID 500 MG
500 TABLET ORAL NIGHTLY
COMMUNITY

## 2025-05-10 RX ORDER — NITROGLYCERIN 0.4 MG/1
0.4 TABLET SUBLINGUAL EVERY 5 MIN PRN
Status: DISCONTINUED | OUTPATIENT
Start: 2025-05-10 | End: 2025-05-12

## 2025-05-10 RX ORDER — SODIUM CHLORIDE 9 MG/ML
125 INJECTION, SOLUTION INTRAVENOUS CONTINUOUS
Status: DISCONTINUED | OUTPATIENT
Start: 2025-05-10 | End: 2025-05-12

## 2025-05-11 LAB
ANION GAP SERPL CALC-SCNC: 2 MMOL/L (ref 0–18)
ATRIAL RATE: 75 BPM
ATRIAL RATE: 88 BPM
BASOPHILS # BLD AUTO: 0.03 X10(3) UL (ref 0–0.2)
BASOPHILS NFR BLD AUTO: 0.3 %
BUN BLD-MCNC: 28 MG/DL (ref 9–23)
CALCIUM BLD-MCNC: 9.4 MG/DL (ref 8.7–10.6)
CHLORIDE SERPL-SCNC: 108 MMOL/L (ref 98–112)
CHOLEST SERPL-MCNC: 174 MG/DL (ref ?–200)
CO2 SERPL-SCNC: 31 MMOL/L (ref 21–32)
CREAT BLD-MCNC: 1.19 MG/DL (ref 0.55–1.02)
EGFRCR SERPLBLD CKD-EPI 2021: 45 ML/MIN/1.73M2 (ref 60–?)
EOSINOPHIL # BLD AUTO: 0.06 X10(3) UL (ref 0–0.7)
EOSINOPHIL NFR BLD AUTO: 0.7 %
ERYTHROCYTE [DISTWIDTH] IN BLOOD BY AUTOMATED COUNT: 14.7 %
GLUCOSE BLD-MCNC: 109 MG/DL (ref 70–99)
HCT VFR BLD AUTO: 40.2 % (ref 35–48)
HDLC SERPL-MCNC: 54 MG/DL (ref 40–59)
HGB BLD-MCNC: 13.7 G/DL (ref 12–16)
IMM GRANULOCYTES # BLD AUTO: 0.09 X10(3) UL (ref 0–1)
IMM GRANULOCYTES NFR BLD: 1 %
LDLC SERPL CALC-MCNC: 96 MG/DL (ref ?–100)
LYMPHOCYTES # BLD AUTO: 1.3 X10(3) UL (ref 1–4)
LYMPHOCYTES NFR BLD AUTO: 14.5 %
MAGNESIUM SERPL-MCNC: 1.9 MG/DL (ref 1.6–2.6)
MCH RBC QN AUTO: 29.8 PG (ref 26–34)
MCHC RBC AUTO-ENTMCNC: 34.1 G/DL (ref 31–37)
MCV RBC AUTO: 87.6 FL (ref 80–100)
MONOCYTES # BLD AUTO: 0.75 X10(3) UL (ref 0.1–1)
MONOCYTES NFR BLD AUTO: 8.4 %
NEUTROPHILS # BLD AUTO: 6.75 X10 (3) UL (ref 1.5–7.7)
NEUTROPHILS # BLD AUTO: 6.75 X10(3) UL (ref 1.5–7.7)
NEUTROPHILS NFR BLD AUTO: 75.1 %
NONHDLC SERPL-MCNC: 120 MG/DL (ref ?–130)
OSMOLALITY SERPL CALC.SUM OF ELEC: 298 MOSM/KG (ref 275–295)
P AXIS: 65 DEGREES
P AXIS: 70 DEGREES
P-R INTERVAL: 190 MS
P-R INTERVAL: 196 MS
PLATELET # BLD AUTO: 161 10(3)UL (ref 150–450)
POTASSIUM SERPL-SCNC: 5.4 MMOL/L (ref 3.5–5.1)
Q-T INTERVAL: 288 MS
Q-T INTERVAL: 344 MS
Q-T INTERVAL: 370 MS
QRS DURATION: 70 MS
QRS DURATION: 74 MS
QRS DURATION: 74 MS
QTC CALCULATION (BEZET): 413 MS
QTC CALCULATION (BEZET): 416 MS
QTC CALCULATION (BEZET): 443 MS
R AXIS: 27 DEGREES
R AXIS: 35 DEGREES
R AXIS: 51 DEGREES
RBC # BLD AUTO: 4.59 X10(6)UL (ref 3.8–5.3)
SODIUM SERPL-SCNC: 141 MMOL/L (ref 136–145)
T AXIS: 13 DEGREES
T AXIS: 46 DEGREES
T AXIS: 49 DEGREES
TRIGL SERPL-MCNC: 138 MG/DL (ref 30–149)
TROPONIN I SERPL HS-MCNC: 35 NG/L (ref ?–34)
TROPONIN I SERPL HS-MCNC: 56 NG/L (ref ?–34)
VENTRICULAR RATE: 142 BPM
VENTRICULAR RATE: 75 BPM
VENTRICULAR RATE: 88 BPM
VLDLC SERPL CALC-MCNC: 23 MG/DL (ref 0–30)
WBC # BLD AUTO: 9 X10(3) UL (ref 4–11)

## 2025-05-11 PROCEDURE — 80048 BASIC METABOLIC PNL TOTAL CA: CPT | Performed by: INTERNAL MEDICINE

## 2025-05-11 PROCEDURE — 84484 ASSAY OF TROPONIN QUANT: CPT | Performed by: HOSPITALIST

## 2025-05-11 PROCEDURE — 84484 ASSAY OF TROPONIN QUANT: CPT | Performed by: INTERNAL MEDICINE

## 2025-05-11 PROCEDURE — 80061 LIPID PANEL: CPT | Performed by: INTERNAL MEDICINE

## 2025-05-11 PROCEDURE — 94760 N-INVAS EAR/PLS OXIMETRY 1: CPT

## 2025-05-11 PROCEDURE — 80048 BASIC METABOLIC PNL TOTAL CA: CPT | Performed by: HOSPITALIST

## 2025-05-11 PROCEDURE — 83735 ASSAY OF MAGNESIUM: CPT | Performed by: INTERNAL MEDICINE

## 2025-05-11 PROCEDURE — 85025 COMPLETE CBC W/AUTO DIFF WBC: CPT | Performed by: INTERNAL MEDICINE

## 2025-05-11 RX ORDER — LISINOPRIL AND HYDROCHLOROTHIAZIDE 12.5; 2 MG/1; MG/1
1 TABLET ORAL DAILY
Status: DISCONTINUED | OUTPATIENT
Start: 2025-05-11 | End: 2025-05-11 | Stop reason: SDUPTHER

## 2025-05-11 RX ORDER — AMLODIPINE BESYLATE 5 MG/1
5 TABLET ORAL ONCE
Status: COMPLETED | OUTPATIENT
Start: 2025-05-11 | End: 2025-05-11

## 2025-05-11 RX ORDER — HEPARIN SODIUM 5000 [USP'U]/ML
5000 INJECTION, SOLUTION INTRAVENOUS; SUBCUTANEOUS EVERY 8 HOURS SCHEDULED
Status: DISCONTINUED | OUTPATIENT
Start: 2025-05-11 | End: 2025-05-11

## 2025-05-11 RX ORDER — ASPIRIN 81 MG/1
81 TABLET ORAL NIGHTLY
Status: DISCONTINUED | OUTPATIENT
Start: 2025-05-11 | End: 2025-05-12

## 2025-05-11 RX ORDER — ANASTROZOLE 1 MG/1
1 TABLET ORAL DAILY
Status: DISCONTINUED | OUTPATIENT
Start: 2025-05-11 | End: 2025-05-12

## 2025-05-11 RX ORDER — HYDRALAZINE HYDROCHLORIDE 20 MG/ML
10 INJECTION INTRAMUSCULAR; INTRAVENOUS EVERY 2 HOUR PRN
Status: DISCONTINUED | OUTPATIENT
Start: 2025-05-11 | End: 2025-05-12

## 2025-05-11 RX ORDER — METOPROLOL TARTRATE 50 MG
50 TABLET ORAL
Status: DISCONTINUED | OUTPATIENT
Start: 2025-05-11 | End: 2025-05-12

## 2025-05-11 RX ORDER — ACETAMINOPHEN 500 MG
500 TABLET ORAL EVERY 4 HOURS PRN
Status: DISCONTINUED | OUTPATIENT
Start: 2025-05-11 | End: 2025-05-12

## 2025-05-11 RX ORDER — LEVOTHYROXINE SODIUM 88 UG/1
88 TABLET ORAL
Status: DISCONTINUED | OUTPATIENT
Start: 2025-05-11 | End: 2025-05-12

## 2025-05-11 NOTE — PROGRESS NOTES
05/10/25 2325 05/10/25 2328 05/10/25 2330   Vital Signs   /76 (!) 165/83 147/74   MAP (mmHg) 98 (!) 108 96   BP Location Right arm Right arm Right arm   BP Method Automatic Automatic Automatic   Patient Position Lying Sitting Standing

## 2025-05-11 NOTE — ED QUICK NOTES
Orders for admission, patient is aware of plan and ready to go upstairs. Any questions, please call ED RN Dimitrios at extension [phone].     Patient Covid vaccination status: Fully vaccinated     COVID Test Ordered in ED: None    COVID Suspicion at Admission: N/A    Running Infusions: Medication Infusions[1]     Mental Status/LOC at time of transport: A/Ox4    Other pertinent information:   CIWA score: N/A   NIH score:  N/A             [1]    sodium chloride 125 mL/hr (05/10/25 7024)

## 2025-05-11 NOTE — ED PROVIDER NOTES
Patient Seen in: [facility] Emergency Department      History     Chief Complaint   Patient presents with    Arrythmia/Palpitations     Stated Complaint: arrived via EMS for c/o \"flutters in chest.\", same sxs 2 wks ago.    Subjective:   HPI    Patient is an 85-year-old female presenting to emergency department after she had developed a sensation of flutters in her chest couple of hours ago with associated jaw tightness which feels like a 1 out of 10 at the time of my evaluation.  No associated trouble breathing, no back pain, no lower extremity edema, no syncope, no other associated symptoms.  Patient does have previous history of SVT followed by atrial flutter and atrial fibrillation.  She is on Eliquis as well as metoprolol and takes blood pressure medication.  She had taken an additional dose of metoprolol approximately an hour prior to calling EMS as instructed.  Paramedics noted atrial fibrillation with rapid ventricular response en route.      Objective:   No pertinent past medical history.            No pertinent past surgical history.              No pertinent social history.            Review of Systems    Positive for stated complaint: arrived via EMS for c/o \"flutters in chest.\", same sxs 2 wks ago.  Other systems are as noted in HPI.  Constitutional and vital signs reviewed.      All other systems reviewed and negative except as noted above.    Physical Exam     ED Triage Vitals   BP 05/10/25 2028 (!) 170/70   Pulse 05/10/25 2025 (!) 146   Resp 05/10/25 2025 (!) 28   Temp 05/10/25 2028 97.9 °F (36.6 °C)   Temp src 05/10/25 2028 Temporal   SpO2 05/10/25 2025 97 %   O2 Device 05/10/25 2025 None (Room air)       Current:/65   Pulse 88   Temp 97.9 °F (36.6 °C) (Temporal)   Resp 16   Ht 162.6 cm (5' 4\")   Wt 99.8 kg   SpO2 95%   BMI 37.76 kg/m²         Physical Exam    Constitutional: No apparent distress  Eyes: No scleral icterus  Heart: Tachycardic, irregularly irregular, no  murmurs  Lungs: Clear to auscultation bilaterally  Abdomen soft and nontender  Skin: No rash  Neuro: Alert and oriented ×3          ED Course/ My interpretations:     Labs Reviewed   COMP METABOLIC PANEL (14) - Abnormal; Notable for the following components:       Result Value    Glucose 216 (*)     BUN 34 (*)     Creatinine 1.65 (*)     Calculated Osmolality 304 (*)     eGFR-Cr 31 (*)     All other components within normal limits   CBC WITH DIFFERENTIAL WITH PLATELET - Abnormal; Notable for the following components:    Neutrophil Absolute Prelim 7.79 (*)     Neutrophil Absolute 7.79 (*)     All other components within normal limits   TROPONIN I HIGH SENSITIVITY - Normal   RAINBOW DRAW BLUE     EKG 20:17    Rate, intervals and axes as noted on EKG Report.  Rate: 142  Rhythm: Atrial Fibrillation  Reading: Atrial fibrillation with rapid ventricular response, marked ST wave depressions are present.         While taking history and examining patient her heart rate slowed down into the 80s EKG repeated  EKG 20:22    Rate, intervals and axes as noted on EKG Report.  Rate: 88  Rhythm: Sinus Rhythm  Reading: Normal sinus rhythm, normal intervals, normal axis, no ST elevations, nonspecific ST wave abnormality is present.    While awaiting blood test results patient had another episode of rapid heart rate in the 150s with her jaw discomfort worsening feeling like a significant tightness in her lower jaw and upper neck.  She did not feel any specific fluttering in her chest but did experience this while her heart rate increased.  As we were attaching the patient to EKG machine to do a repeat EKG patient's heart rate returned into the 80s.  Persistent jaw tightness of 3 out of 10 was reported by the patient at this time    EKG 21:04    Rate, intervals and axes as noted on EKG Report.  Rate: 75  Rhythm: Sinus Rhythm  Reading: Normal sinus rhythm, normal intervals, normal axis, nonspecific ST wave abnormality is present.  Not  significantly changed from EKG performed at 20: 22    Pain addressed with nitroglycerin with improvement.  My independent imaging interpretation:      Procedures    Comp Metabolic Panel (14)    Troponin I (High Sensitivity)    CBC With Differential With Platelet        All available radiology reports for this visit reviewed.      Medications given:  Orders Placed This Encounter    Comp Metabolic Panel (14)    Troponin I (High Sensitivity)    CBC With Differential With Platelet    nitroglycerin (Nitrostat) SL tab 0.4 mg    nitroglycerin (Nitrostat) SL tab 0.4 mg    DISCONTD: nitroGLYCERIN (Nitrobid) 2 % ointment 1 inch    nitroGLYCERIN (Nitrobid) 2 % ointment 0.5 inch    sodium chloride 0.9% infusion           MDM      Extensive differential diagnosis was considered including underlying cardiac, pulmonary, thromboembolic, gastrointestinal, vascular, infectious and other etiologies.    Initial EKG showed significant ST wave depressions which resolved when she converted back into normal sinus.     Cardiac enzymes were unremarkable, remainder of the emergency department work-up was negative.     Jaw discomfort increased when patient's heart rate increased again.  Addressed with nitroglycerin with improvement.    Case discussed with cardiology, patient will be admitted with her enzymes trended under the care of the hospitalist with cardiology on consultation.  They do recommend to start Cardizem drip if patient goes back into atrial fibs with rapid rate.  Currently normal sinus rhythm heart rate of 75.    Disposition and Plan     Clinical Impression:  1. Atrial fibrillation with rapid ventricular response (HCC)    2. Jaw pain         Disposition:  Admit  5/10/2025  9:47 pm    Follow-up:  No follow-up provider specified.        Medications Prescribed:  Current Discharge Medication List          Supplementary Documentation:         Hospital Problems       Present on Admission  Date Reviewed: 11/17/2021   None                                                    Hospital Problems       Present on Admission  Date Reviewed: 11/17/2021   None          Documentation created with the aid of Dragon voice recognition software.  Although efforts were made to ensure the accuracy of the note, some inaccuracies may persist.

## 2025-05-11 NOTE — PLAN OF CARE
Assumed care of patient at 0700. Pt A/Ox 4. O2 sats maintained on room air. NSR with 1st degree AVB on tele. Last BM 5/10. Voiding without difficulty. Pt reports mild headache pain, declining Tylenol. Nitroglycerin paste removed. Pt up standby assist. Pt updated on plan of care. Care needs met. Bed in lowest position, Call light within reach. Bed alarm on.     POC: IVF, monitor BP, trend troponin, cardiology consult     Problem: Patient/Family Goals  Goal: Patient/Family Long Term Goal  Description: Patient's Long Term Goal: Stay healthy  Interventions:- Resume home med regimen, follow up with providers  - See additional Care Plan goals for specific interventions  Outcome: Progressing  Goal: Patient/Family Short Term Goal  Description: Patient's Short Term Goal: Discharge from hospital  Interventions: - Trend trops, IVFs, cards to see, monitor heart rhythm- See additional Care Plan goals for specific interventions  Outcome: Progressing     Problem: CARDIOVASCULAR - ADULT  Goal: Maintains optimal cardiac output and hemodynamic stability  Description: INTERVENTIONS:- Monitor vital signs, rhythm, and trends- Monitor for bleeding, hypotension and signs of decreased cardiac output- Evaluate effectiveness of vasoactive medications to optimize hemodynamic stability- Monitor arterial and/or venous puncture sites for bleeding and/or hematoma- Assess quality of pulses, skin color and temperature- Assess for signs of decreased coronary artery perfusion - ex. Angina- Evaluate fluid balance, assess for edema, trend weights  Outcome: Progressing  Goal: Absence of cardiac arrhythmias or at baseline  Description: INTERVENTIONS:- Continuous cardiac monitoring, monitor vital signs, obtain 12 lead EKG if indicated- Evaluate effectiveness of antiarrhythmic and heart rate control medications as ordered- Initiate emergency measures for life threatening arrhythmias- Monitor electrolytes and administer replacement therapy as  ordered  Outcome: Progressing     Problem: PAIN - ADULT  Goal: Verbalizes/displays adequate comfort level or patient's stated pain goal  Description: INTERVENTIONS:- Encourage pt to monitor pain and request assistance- Assess pain using appropriate pain scale- Administer analgesics based on type and severity of pain and evaluate response- Implement non-pharmacological measures as appropriate and evaluate response- Consider cultural and social influences on pain and pain management- Manage/alleviate anxiety- Utilize distraction and/or relaxation techniques- Monitor for opioid side effects- Notify MD/LIP if interventions unsuccessful or patient reports new pain- Anticipate increased pain with activity and pre-medicate as appropriate  Outcome: Progressing

## 2025-05-11 NOTE — ED INITIAL ASSESSMENT (HPI)
Pt arrived via EMS for c/o \"flutters in chest\", states sxs 2 wks ago. Pt denies CP. States she took Metoprolol 50 mg around 1930, \"told me to do that when I was here 2 wks ago.\"  Pt denies HEATHER

## 2025-05-11 NOTE — CONSULTS
[facility]    Division of Cardiology    [id number] Note      Maritza Cantu Patient Status:  Inpatient    1940 MRN [id number]   Location [facility] 2NE-A Attending Siena Garcia MD   Hosp Day # 2 PCP Shauna Baez MD   Primary Cards Heather SRIVASTAVA       Impresison:  PAF  RVR on admission  Currently NSR  Hx SVT/AF  On AC and BB baseline  HTN  Pressrues currently up to 190s  On hydrochlorothiazide/ACEI combo  Cr 1.7 on admission  Amlodipine and PRN hydralizine for now  Might suggest alternative antihypertensive moving forward  HENRI  Cr 1.7 on admission  Down to 1.2 currently  Troponinemia  Due to PAF  EKG normal  Given abnormal trop, jaw pain, and RF, suggest lexiscan perfusion stress in AM    Plan:  Amlodipine and Hydralizine for acute HTN; hold ACEI and hydrochlorothiazide given Cr 1.7.  Continue other baseline meds for rate control and anticoagulation.  Sally perfusion tomorrow given RF and jaw pain.  Echo.  Consider watchman given her fear of anticoagulation; can discuss as an outpatient.       Chief Complaint: AF RVR, palps    History of Present Illness: Maritza Cantu is a(n) 85 year old female with hx PAF and prior admission a few weeks ago for AF.    She had palps and EMS was called and she was tachycardic and was admitted.  She is followed by Dr. Romero in clinic.    She had jaw pain with the palpitations and was worried she was having an MI.    She converted quickly to NSR right after admission.    She doesn't do much exercise at baseline.    But denies CP.  She does have HINOJOSA with mild exertion such as a single flight of stairs.  Sometimes stops 1/2 way up to rest coming up from the basement.    Currently NSR and comfortable.         History:  Past Medical History:    Arrhythmia    SVT AND ATRIAL FLUTTER    Back problem    Cancer (HCC)    Breast cancer    Colon adenoma    Compression of lumbar nerve root    Degenerative lumbar spinal stenosis    Diaphragmatic  hernia without mention of obstruction or gangrene    Difficult intubation    Disorder of thyroid    Esophageal reflux    Exposure to medical diagnostic radiation    Last treatment 01/2024    Hearing impairment    Roman HA's    High blood pressure    History of stomach ulcers    A FEW YEARS AGO    Hypothyroidism    Mixed hyperlipidemia    OBESITY    Osteoarthrosis, unspecified whether generalized or localized, unspecified site    Osteopenia    TINNITUS NOS    Unspecified vitamin D deficiency    Visual impairment    glasses     Past Surgical History:   Procedure Laterality Date    Arthroscopy of joint unlisted Right     knee    Colonoscopy  09/2003    normal    Colonoscopy  10/6/11 - [facility]    adenoma, hemorrhoids. repeat 2021.    Colonoscopy,diagnostic  11/17/2016    normal    Excisional biospy right  1995    benign    Hernia surgery  1985    inguinal    Hysterectomy  1985    with BSO    Lumbar spine surgery      Repair rotator cuff,acute Right 06/2014    Tonsillectomy      removed in childhood around age 12    Upper gi endoscopy - referral  7/9/12 - [facility]    small gastric erosion, no etiology for bleeding identifiedesophageal and gastric bx negative    Upper gi endoscopy,diagnosis  09/2003    hiatal hernia     Social History     Socioeconomic History    Marital status:    Tobacco Use    Smoking status: Never    Smokeless tobacco: Never   Vaping Use    Vaping status: Never Used   Substance and Sexual Activity    Alcohol use: Yes     Comment: 1-3 a week    Drug use: No    Sexual activity: Never     Family History   Problem Relation Age of Onset    Breast Cancer Sister 46        dx 46 passed 51    Other (Other) Sister         hypothyroid    Lipids Father     Hypertension Father     Heart Disorder Father     Other (Other) Daughter         hypothyroid    Other (Other) Daughter         hypothyroid    Other (Other) Daughter         hypothyroid    Breast Cancer Maternal Aunt 60        age at dx 60    Breast Cancer  Cousin 48        dx 48        Inpatient Medications:    apixaban  5 mg Oral BID    anastrozole  1 mg Oral Daily    aspirin  81 mg Oral Nightly    levothyroxine  88 mcg Oral Before breakfast    metoprolol tartrate  50 mg Oral 2x Daily(Beta Blocker)       Continuous Infusions:    sodium chloride Stopped (05/11/25 1043)       PRN Medications:     acetaminophen    hydrALAzine    nitroglycerin    Outpatient Medications:   Current Facility-Administered Medications on File Prior to Encounter   Medication Dose Route Frequency Provider Last Rate Last Admin    [COMPLETED] dilTIAZem (cardIZEM) 25 mg/5mL injection 20 mg  20 mg Intravenous Once Ezra Schulte MD   20 mg at 04/24/25 1246     Current Outpatient Medications on File Prior to Encounter   Medication Sig Dispense Refill    anastrozole 1 MG Oral Tab tab Take 1 tablet (1 mg total) by mouth at noon.      Vitamin C 500 MG Oral Tab Take 1 tablet (500 mg total) by mouth nightly.      apixaban 5 MG Oral Tab Take 1 tablet (5 mg total) by mouth in the morning and 1 tablet (5 mg total) before bedtime. Can resume POD 7.      aspirin 81 MG Oral Tab EC Take 1 tablet (81 mg total) by mouth nightly. Can resume POD 7      levothyroxine 88 MCG Oral Tab Take 1 tablet (88 mcg total) by mouth before breakfast.      Lisinopril-hydroCHLOROthiazide 20-12.5 MG Oral Tab Take 1 tablet by mouth daily. 90 tablet 3    lisinopril 20 MG Oral Tab Take 1 tablet (20 mg total) by mouth daily. (Patient taking differently: Take 1 tablet (20 mg total) by mouth nightly.) 90 tablet 3    metoprolol tartrate 50 MG Oral Tab Take 1 tablet (50 mg total) by mouth 2 (two) times daily. 180 tablet 3    CALCIUM 600 + D OR Take 1 tablet by mouth in the morning.      HYDROcodone-acetaminophen  MG Oral Tab Take 1 tablet by mouth every 6 (six) hours as needed. (Patient not taking: Reported on 5/10/2025)      methocarbamol 500 MG Oral Tab Take 1 tablet (500 mg total) by mouth 3 (three) times daily. (Patient not  taking: Reported on 5/10/2025)         Home Medications:  Outpatient Medications Marked as Taking for the 5/10/25 encounter (Hospital Encounter)   Medication Sig Dispense Refill    anastrozole 1 MG Oral Tab tab Take 1 tablet (1 mg total) by mouth at noon.      Vitamin C 500 MG Oral Tab Take 1 tablet (500 mg total) by mouth nightly.      apixaban 5 MG Oral Tab Take 1 tablet (5 mg total) by mouth in the morning and 1 tablet (5 mg total) before bedtime. Can resume POD 7.      aspirin 81 MG Oral Tab EC Take 1 tablet (81 mg total) by mouth nightly. Can resume POD 7      levothyroxine 88 MCG Oral Tab Take 1 tablet (88 mcg total) by mouth before breakfast.      Lisinopril-hydroCHLOROthiazide 20-12.5 MG Oral Tab Take 1 tablet by mouth daily. 90 tablet 3    lisinopril 20 MG Oral Tab Take 1 tablet (20 mg total) by mouth daily. (Patient taking differently: Take 1 tablet (20 mg total) by mouth nightly.) 90 tablet 3    metoprolol tartrate 50 MG Oral Tab Take 1 tablet (50 mg total) by mouth 2 (two) times daily. 180 tablet 3    CALCIUM 600 + D OR Take 1 tablet by mouth in the morning.         Allergies:   Allergies   Allergen Reactions    Erythromycin Base NAUSEA AND VOMITING    Talwin [Pentazocine Lactate] NAUSEA AND VOMITING     CRAMP    Levofloxacin [Quixin]      Pt does want to take due to possible side effects     Daypro [Oxaprozin] NAUSEA ONLY    Tetracycline Base RASH        Review of Systems:   Ten point review of systems is unremarkable except as mentioned above.     Vitals:   BP (!) 184/82 (BP Location: Right arm)   Pulse 80   Temp 98 °F (36.7 °C) (Oral)   Resp 18   Ht 64\"   Wt 222 lb (100.7 kg)   SpO2 96%   BMI 38.11 kg/m²   Wt Readings from Last 3 Encounters:   05/10/25 222 lb (100.7 kg)   04/24/25 220 lb (99.8 kg)   08/05/24 225 lb (102.1 kg)       Examination:  General: Well developed, well nourished female.  No distress.  Neck:  No JVD.  Normal ROM.  Cardiac: Regular rate and rhythm.  No murmurs, rubs, or  gallops.   Lungs: Clear to ascultation bilaterally.    Abdomen: Soft.  Non-distended.  Non-tender.  Bowel sounds present.    Extremities: Warm, no significant edema.  Palpable pulses.  Neurologic: Alert and oriented.  No gross deficits.  Integument:  No visible rashes identified.  Psych: The patient's affect is appropriate.     Labs:   Recent Labs   Lab 05/10/25  2030 05/11/25  0743   WBC 10.0 9.0   HGB 15.2 13.7   MCV 87.0 87.6   .0 161.0     Recent Labs   Lab 05/10/25  2030 05/11/25  0743    141   K 4.1 5.4*    108   CO2 27.0 31.0   BUN 34* 28*   CREATSERUM 1.65* 1.19*   * 109*   CA 9.7 9.4   MG  --  1.9     Recent Labs   Lab 05/10/25  2030   ALT 20   AST 26   ALB 4.6     Lab Results   Component Value Date    A1C 5.5 11/08/2018     Lab Results   Component Value Date    CHOLEST 174 05/11/2025    HDL 54 05/11/2025    LDL 96 05/11/2025    TRIG 138 05/11/2025        Studies:   Ekg: Sinus.  IRBBB.  PRWP.  No evidence of anterior infarct just PRWP.  CXR:   FINDINGS:  Tortuous ectatic aorta present.  Cardiac shadow is enlarged..  The lungs are clear of active--appearing disease process.  The costophrenic angles are sharp.  There is no active disease seen.   Tele: Sinus.  No sig arrhythmia  Echo  Do not see prior echo.  Thank you for allowing [facility] to care for your patient. Please contact me with any questions!     German Krishnamurthy MD  General, Interventional  Structural & Endovascular  Cardiology

## 2025-05-11 NOTE — ED QUICK NOTES
Pt HR up to 160's, reports \"jaw tightness\" up to 3/10. Dr Baez informed who came to the bedside. While talking to the pt, HR came down to 60's-70's, NSR

## 2025-05-11 NOTE — PLAN OF CARE
Patient arrived onto unit via cart @ 2300    Patient alert and oriented x4. On RA, sats above 95%. NSR on tele. Continent bowel and bladder, denies pain, chest pain, palpitations at this time. Up SBA. Call light within reach. Fall precautions in place. Makes needs known    Oriented patient to room and call light, verbalizes understanding. Navigators completed. Discussed plan with patient and family, questions answered.    0430  SBP 180s with c/o headache. Notified cards, given x1 amlodipine. Recheck BP @ 0640 remains elevated, NNO from cards for now.    Plan  IVFs  Trend trops    Problem: Patient/Family Goals  Goal: Patient/Family Long Term Goal  Description: Patient's Long Term Goal: Stay healthy  Interventions:- Resume home med regimen, follow up with providers  - See additional Care Plan goals for specific interventions  Outcome: Progressing  Goal: Patient/Family Short Term Goal  Description: Patient's Short Term Goal: Discharge from hospital  Interventions: - Trend trops, IVFs, cards to see, monitor heart rhythm- See additional Care Plan goals for specific interventions  Outcome: Progressing     Problem: CARDIOVASCULAR - ADULT  Goal: Maintains optimal cardiac output and hemodynamic stability  Description: INTERVENTIONS:- Monitor vital signs, rhythm, and trends- Monitor for bleeding, hypotension and signs of decreased cardiac output- Evaluate effectiveness of vasoactive medications to optimize hemodynamic stability- Monitor arterial and/or venous puncture sites for bleeding and/or hematoma- Assess quality of pulses, skin color and temperature- Assess for signs of decreased coronary artery perfusion - ex. Angina- Evaluate fluid balance, assess for edema, trend weights  Outcome: Progressing  Goal: Absence of cardiac arrhythmias or at baseline  Description: INTERVENTIONS:- Continuous cardiac monitoring, monitor vital signs, obtain 12 lead EKG if indicated- Evaluate effectiveness of antiarrhythmic and heart rate  control medications as ordered- Initiate emergency measures for life threatening arrhythmias- Monitor electrolytes and administer replacement therapy as ordered  Outcome: Progressing     Problem: PAIN - ADULT  Goal: Verbalizes/displays adequate comfort level or patient's stated pain goal  Description: INTERVENTIONS:- Encourage pt to monitor pain and request assistance- Assess pain using appropriate pain scale- Administer analgesics based on type and severity of pain and evaluate response- Implement non-pharmacological measures as appropriate and evaluate response- Consider cultural and social influences on pain and pain management- Manage/alleviate anxiety- Utilize distraction and/or relaxation techniques- Monitor for opioid side effects- Notify MD/LIP if interventions unsuccessful or patient reports new pain- Anticipate increased pain with activity and pre-medicate as appropriate  Outcome: Progressing

## 2025-05-12 ENCOUNTER — APPOINTMENT (OUTPATIENT)
Dept: CV DIAGNOSTICS | Facility: HOSPITAL | Age: 85
End: 2025-05-12
Attending: INTERNAL MEDICINE
Payer: MEDICARE

## 2025-05-12 VITALS
WEIGHT: 222 LBS | SYSTOLIC BLOOD PRESSURE: 154 MMHG | OXYGEN SATURATION: 96 % | DIASTOLIC BLOOD PRESSURE: 71 MMHG | TEMPERATURE: 98 F | RESPIRATION RATE: 18 BRPM | BODY MASS INDEX: 37.9 KG/M2 | HEIGHT: 64 IN | HEART RATE: 80 BPM

## 2025-05-12 LAB
ANION GAP SERPL CALC-SCNC: 4 MMOL/L (ref 0–18)
ANION GAP SERPL CALC-SCNC: 5 MMOL/L (ref 0–18)
BUN BLD-MCNC: 26 MG/DL (ref 9–23)
BUN BLD-MCNC: 28 MG/DL (ref 9–23)
CALCIUM BLD-MCNC: 10.1 MG/DL (ref 8.7–10.6)
CALCIUM BLD-MCNC: 9.6 MG/DL (ref 8.7–10.6)
CHLORIDE SERPL-SCNC: 102 MMOL/L (ref 98–112)
CHLORIDE SERPL-SCNC: 106 MMOL/L (ref 98–112)
CO2 SERPL-SCNC: 27 MMOL/L (ref 21–32)
CO2 SERPL-SCNC: 32 MMOL/L (ref 21–32)
CREAT BLD-MCNC: 1.03 MG/DL (ref 0.55–1.02)
CREAT BLD-MCNC: 1.07 MG/DL (ref 0.55–1.02)
EGFRCR SERPLBLD CKD-EPI 2021: 52 ML/MIN/1.73M2 (ref 60–?)
EGFRCR SERPLBLD CKD-EPI 2021: 54 ML/MIN/1.73M2 (ref 60–?)
GLUCOSE BLD-MCNC: 108 MG/DL (ref 70–99)
GLUCOSE BLD-MCNC: 139 MG/DL (ref 70–99)
OSMOLALITY SERPL CALC.SUM OF ELEC: 292 MOSM/KG (ref 275–295)
OSMOLALITY SERPL CALC.SUM OF ELEC: 293 MOSM/KG (ref 275–295)
POTASSIUM SERPL-SCNC: 4.3 MMOL/L (ref 3.5–5.1)
POTASSIUM SERPL-SCNC: 4.6 MMOL/L (ref 3.5–5.1)
SODIUM SERPL-SCNC: 138 MMOL/L (ref 136–145)
SODIUM SERPL-SCNC: 138 MMOL/L (ref 136–145)

## 2025-05-12 PROCEDURE — 93018 CV STRESS TEST I&R ONLY: CPT | Performed by: INTERNAL MEDICINE

## 2025-05-12 PROCEDURE — 93306 TTE W/DOPPLER COMPLETE: CPT | Performed by: INTERNAL MEDICINE

## 2025-05-12 PROCEDURE — 93017 CV STRESS TEST TRACING ONLY: CPT | Performed by: INTERNAL MEDICINE

## 2025-05-12 PROCEDURE — 80048 BASIC METABOLIC PNL TOTAL CA: CPT | Performed by: HOSPITALIST

## 2025-05-12 PROCEDURE — 78452 HT MUSCLE IMAGE SPECT MULT: CPT | Performed by: INTERNAL MEDICINE

## 2025-05-12 RX ORDER — AMLODIPINE BESYLATE 10 MG/1
10 TABLET ORAL DAILY
Status: DISCONTINUED | OUTPATIENT
Start: 2025-05-12 | End: 2025-05-12

## 2025-05-12 RX ORDER — METOPROLOL TARTRATE 75 MG/1
75 TABLET ORAL
Qty: 60 TABLET | Refills: 0 | Status: SHIPPED | OUTPATIENT
Start: 2025-05-12 | End: 2025-05-12

## 2025-05-12 RX ORDER — LISINOPRIL 20 MG/1
10 TABLET ORAL DAILY
Qty: 90 TABLET | Refills: 3 | Status: SHIPPED | OUTPATIENT
Start: 2025-05-12

## 2025-05-12 RX ORDER — AMLODIPINE BESYLATE 10 MG/1
10 TABLET ORAL DAILY
Qty: 30 TABLET | Refills: 0 | Status: SHIPPED | OUTPATIENT
Start: 2025-05-13

## 2025-05-12 RX ORDER — REGADENOSON 0.08 MG/ML
INJECTION, SOLUTION INTRAVENOUS
Status: COMPLETED
Start: 2025-05-12 | End: 2025-05-12

## 2025-05-12 NOTE — PROGRESS NOTES
.[facility] note    PCP: Shauna Baez MD    Chief Complaint:  F/u jaw discomfort, afib with rvr    SUBJECTIVE:  No sob, no jaw discomfort. Had stress test    OBJECTIVE:  Temp:  [97.8 °F (36.6 °C)-98.4 °F (36.9 °C)] 98.4 °F (36.9 °C)  Pulse:  [69-92] 76  Resp:  [18] 18  BP: (149-184)/(61-82) 149/61  SpO2:  [94 %-96 %] 96 %    Intake/Output:    Intake/Output Summary (Last 24 hours) at 5/12/2025 1317  Last data filed at 5/12/2025 0838  Gross per 24 hour   Intake 720 ml   Output 0 ml   Net 720 ml       Last 3 Weights   05/10/25 2353 222 lb (100.7 kg)   05/10/25 2330 222 lb 0.1 oz (100.7 kg)   05/10/25 2025 220 lb (99.8 kg)   04/24/25 1217 220 lb (99.8 kg)   08/05/24 1712 225 lb (102.1 kg)   08/05/24 1119 225 lb (102.1 kg)   07/16/24 1614 226 lb (102.5 kg)       Exam  Gen: No acute distress  HEENT: anicteric sclera, MMM  Pulm: Lungs clear, normal respiratory effort  CV: Heart with regular rate and rhythm, no peripheral edema  Abd: Abdomen soft, nontender, nondistended, no organomegaly, bowel sounds present  MSK: Full range of motion in extremities, no clubbing, no cyanosis  Skin: no rashes or lesions  Neuro: A&OX3, no focal deficits    Data Review:         Labs:     Recent Labs   Lab 05/10/25  2030 05/11/25  0743   WBC 10.0 9.0   HGB 15.2 13.7   MCV 87.0 87.6   .0 161.0   NE 7.79* 6.75   LYMABS 1.20 1.30       Recent Labs   Lab 05/10/25  2030 05/11/25  0743 05/11/25  1526    141 138   K 4.1 5.4* 4.6    108 106   CO2 27.0 31.0 27.0   BUN 34* 28* 28*   CREATSERUM 1.65* 1.19* 1.07*   CA 9.7 9.4 9.6   MG  --  1.9  --    * 109* 108*       Recent Labs   Lab 05/10/25  2030   ALT 20   AST 26   ALB 4.6       No results for input(s): \"TROP\", \"CK\", \"PBNP\", \"PCT\" in the last 168 hours.    No results for input(s): \"CRP\", \"MOOSE\", \"LDH\", \"DDIMER\" in the last 168 hours.    No results for input(s): \"PGLU\" in the last 168 hours.    Meds:   Scheduled Medication:Scheduled Medications[1]  Continuous Infusing  Medication:Medication Infusions[2]  PRN Medication:PRN Medications[3]       Microbiology:    No results found for this visit on 05/10/25.    No results found for: \"COVID19\"     Assessment/Plan:     85-year-old woman with history of A-fib who presented with palpitations and jaw discomfort     Palpitations   jaw discomfort  A-fib with RVR  Mildly elevated troponin  -Currently in normal sinus rhythm  - Patient's symptoms on presentation may have been related to uncontrolled A-fib plus/minus cardiac ischemic process  - Continue anticoagulation and beta-blocker  - Stress test and echo done today  - cont eliquis     Hypertension, elevated today  - Hold hydrochlorothiazide and ACE inhibitor given abnormal renal indices which appear to be improving. Will repeat today's bmp  - Continue beta-blocker at higher dose and amlodipine started as well. Might favor continuing with this for bp control as opposed to ace/hctz     HENRI, hyperkalemia  - Creatinine better after IV fluid, will trend renal indices and cont to hold ACE and thiazide     Possible dc today pending testing results      Siena Garcia MD  [facility]  Pager: [phone]         [1]    amLODIPine  10 mg Oral Daily    metoprolol tartrate  75 mg Oral 2x Daily(Beta Blocker)    apixaban  5 mg Oral BID    anastrozole  1 mg Oral Daily    aspirin  81 mg Oral Nightly    levothyroxine  88 mcg Oral Before breakfast   [2]    sodium chloride Stopped (05/11/25 1043)   [3]   acetaminophen    hydrALAzine    nitroglycerin

## 2025-05-12 NOTE — PROGRESS NOTES
[facility]    Division of Cardiology    Progress Note      Maritza Cantu Patient Status:  Inpatient    1940 MRN [id number]   Location [facility] 2NE-A Attending Siena Garcia MD   Hosp Day # 2 PCP Shauna Baez MD   Primary Cards Heather SRIVASTAVA       Impresison:  PAF  RVR on admission  Currently NSR  Hx SVT/AF  On AC and BB baseline  HTN  Pressrues currently up to 190s  On hydrochlorothiazide/ACEI combo  Cr 1.7 on admission  Amlodipine and PRN hydralizine for now  Might suggest alternative antihypertensive moving forward  HENRI  Cr 1.7 on admission  Down to 1.2 currently  Troponinemia  Due to PAF  EKG normal  Given abnormal trop, jaw pain, and RF, suggest lexiscan perfusion stress in AM    Plan:  HTN: Plan for amlodipine 10 mg daily   PRN  Hydralizine for acute HTN  Holding ACEI and hydrochlorothiazide given Cr 1.7> 1.19> BMP today pending.   Continue other baseline meds for rate control and anticoagulation.  Can consider increasing normal BB as OP if afib events recur or continue.   Sally perfusion today given RF and jaw pain.  Echo pending  Consider watchman given her fear of anticoagulation; can discuss as an outpatient.       Chief Complaint: AF RVR, palps    History of Present Illness: Maritza Cantu is a(n) 85 year old female with hx PAF and prior admission a few weeks ago for AF.    She had palps and EMS was called and she was tachycardic and was admitted.  She is followed by Dr. Romero in clinic.    She had jaw pain with the palpitations and was worried she was having an MI.    She converted quickly to NSR right after admission.    She doesn't do much exercise at baseline.    But denies CP.  She does have HINOJOSA with mild exertion such as a single flight of stairs.  Sometimes stops 1/2 way up to rest coming up from the basement.    Currently NSR and comfortable.         History:  Past Medical History:    Arrhythmia    SVT AND ATRIAL FLUTTER    Back problem     Cancer (HCC)    Breast cancer    Colon adenoma    Compression of lumbar nerve root    Degenerative lumbar spinal stenosis    Diaphragmatic hernia without mention of obstruction or gangrene    Difficult intubation    Disorder of thyroid    Esophageal reflux    Exposure to medical diagnostic radiation    Last treatment 01/2024    Hearing impairment    Roman HA's    High blood pressure    History of stomach ulcers    A FEW YEARS AGO    Hypothyroidism    Mixed hyperlipidemia    OBESITY    Osteoarthrosis, unspecified whether generalized or localized, unspecified site    Osteopenia    TINNITUS NOS    Unspecified vitamin D deficiency    Visual impairment    glasses     Past Surgical History:   Procedure Laterality Date    Arthroscopy of joint unlisted Right     knee    Colonoscopy  09/2003    normal    Colonoscopy  10/6/11 - [facility]    adenoma, hemorrhoids. repeat 2021.    Colonoscopy,diagnostic  11/17/2016    normal    Excisional biospy right  1995    benign    Hernia surgery  1985    inguinal    Hysterectomy  1985    with BSO    Lumbar spine surgery      Repair rotator cuff,acute Right 06/2014    Tonsillectomy      removed in childhood around age 12    Upper gi endoscopy - referral  7/9/12 - [facility]    small gastric erosion, no etiology for bleeding identifiedesophageal and gastric bx negative    Upper gi endoscopy,diagnosis  09/2003    hiatal hernia     Social History     Socioeconomic History    Marital status:    Tobacco Use    Smoking status: Never    Smokeless tobacco: Never   Vaping Use    Vaping status: Never Used   Substance and Sexual Activity    Alcohol use: Yes     Comment: 1-3 a week    Drug use: No    Sexual activity: Never     Family History   Problem Relation Age of Onset    Breast Cancer Sister 46        dx 46 passed 51    Other (Other) Sister         hypothyroid    Lipids Father     Hypertension Father     Heart Disorder Father     Other (Other) Daughter         hypothyroid    Other (Other) Daughter          hypothyroid    Other (Other) Daughter         hypothyroid    Breast Cancer Maternal Aunt 60        age at dx 60    Breast Cancer Cousin 48        dx 48        Inpatient Medications:    apixaban  5 mg Oral BID    anastrozole  1 mg Oral Daily    aspirin  81 mg Oral Nightly    levothyroxine  88 mcg Oral Before breakfast    metoprolol tartrate  50 mg Oral 2x Daily(Beta Blocker)       Continuous Infusions:    sodium chloride Stopped (05/11/25 1043)       PRN Medications:     acetaminophen    hydrALAzine    nitroglycerin    Outpatient Medications:   Current Facility-Administered Medications on File Prior to Encounter   Medication Dose Route Frequency Provider Last Rate Last Admin    [COMPLETED] dilTIAZem (cardIZEM) 25 mg/5mL injection 20 mg  20 mg Intravenous Once Ezra Schulte MD   20 mg at 04/24/25 1246     Current Outpatient Medications on File Prior to Encounter   Medication Sig Dispense Refill    anastrozole 1 MG Oral Tab tab Take 1 tablet (1 mg total) by mouth at noon.      Vitamin C 500 MG Oral Tab Take 1 tablet (500 mg total) by mouth nightly.      apixaban 5 MG Oral Tab Take 1 tablet (5 mg total) by mouth in the morning and 1 tablet (5 mg total) before bedtime. Can resume POD 7.      aspirin 81 MG Oral Tab EC Take 1 tablet (81 mg total) by mouth nightly. Can resume POD 7      levothyroxine 88 MCG Oral Tab Take 1 tablet (88 mcg total) by mouth before breakfast.      Lisinopril-hydroCHLOROthiazide 20-12.5 MG Oral Tab Take 1 tablet by mouth daily. 90 tablet 3    lisinopril 20 MG Oral Tab Take 1 tablet (20 mg total) by mouth daily. (Patient taking differently: Take 1 tablet (20 mg total) by mouth nightly.) 90 tablet 3    metoprolol tartrate 50 MG Oral Tab Take 1 tablet (50 mg total) by mouth 2 (two) times daily. 180 tablet 3    CALCIUM 600 + D OR Take 1 tablet by mouth in the morning.      HYDROcodone-acetaminophen  MG Oral Tab Take 1 tablet by mouth every 6 (six) hours as needed. (Patient not  taking: Reported on 5/10/2025)      methocarbamol 500 MG Oral Tab Take 1 tablet (500 mg total) by mouth 3 (three) times daily. (Patient not taking: Reported on 5/10/2025)         Home Medications:  Outpatient Medications Marked as Taking for the 5/10/25 encounter (Hospital Encounter)   Medication Sig Dispense Refill    anastrozole 1 MG Oral Tab tab Take 1 tablet (1 mg total) by mouth at noon.      Vitamin C 500 MG Oral Tab Take 1 tablet (500 mg total) by mouth nightly.      apixaban 5 MG Oral Tab Take 1 tablet (5 mg total) by mouth in the morning and 1 tablet (5 mg total) before bedtime. Can resume POD 7.      aspirin 81 MG Oral Tab EC Take 1 tablet (81 mg total) by mouth nightly. Can resume POD 7      levothyroxine 88 MCG Oral Tab Take 1 tablet (88 mcg total) by mouth before breakfast.      Lisinopril-hydroCHLOROthiazide 20-12.5 MG Oral Tab Take 1 tablet by mouth daily. 90 tablet 3    lisinopril 20 MG Oral Tab Take 1 tablet (20 mg total) by mouth daily. (Patient taking differently: Take 1 tablet (20 mg total) by mouth nightly.) 90 tablet 3    metoprolol tartrate 50 MG Oral Tab Take 1 tablet (50 mg total) by mouth 2 (two) times daily. 180 tablet 3    CALCIUM 600 + D OR Take 1 tablet by mouth in the morning.         Allergies:   Allergies   Allergen Reactions    Erythromycin Base NAUSEA AND VOMITING    Talwin [Pentazocine Lactate] NAUSEA AND VOMITING     CRAMP    Levofloxacin [Quixin]      Pt does want to take due to possible side effects     Daypro [Oxaprozin] NAUSEA ONLY    Tetracycline Base RASH        Review of Systems:   Ten point review of systems is unremarkable except as mentioned above.     Vitals:   BP (!) 175/66 (BP Location: Right arm)   Pulse 71   Temp 98.3 °F (36.8 °C) (Oral)   Resp 18   Ht 5' 4\" (1.626 m)   Wt 222 lb (100.7 kg)   SpO2 96%   BMI 38.11 kg/m²   Wt Readings from Last 3 Encounters:   05/10/25 222 lb (100.7 kg)   04/24/25 220 lb (99.8 kg)   08/05/24 225 lb (102.1 kg)        Examination:  General: Well developed, well nourished female.  No distress.  Neck:  No JVD.  Normal ROM.  Cardiac: Regular rate and rhythm.  No murmurs, rubs, or gallops.   Lungs: Clear to ascultation bilaterally.    Abdomen: Soft.  Non-distended.  Non-tender.  Bowel sounds present.    Extremities: Warm, no significant edema.  Palpable pulses.  Neurologic: Alert and oriented.  No gross deficits.  Integument:  No visible rashes identified.  Psych: The patient's affect is appropriate.     Labs:   Recent Labs   Lab 05/10/25  2030 05/11/25  0743   WBC 10.0 9.0   HGB 15.2 13.7   MCV 87.0 87.6   .0 161.0     Recent Labs   Lab 05/10/25  2030 05/11/25  0743    141   K 4.1 5.4*    108   CO2 27.0 31.0   BUN 34* 28*   CREATSERUM 1.65* 1.19*   * 109*   CA 9.7 9.4   MG  --  1.9     Recent Labs   Lab 05/10/25  2030   ALT 20   AST 26   ALB 4.6     Lab Results   Component Value Date    A1C 5.5 11/08/2018     Lab Results   Component Value Date    CHOLEST 174 05/11/2025    HDL 54 05/11/2025    LDL 96 05/11/2025    TRIG 138 05/11/2025        Studies:   Ekg: Sinus.  IRBBB.  PRWP.  No evidence of anterior infarct just PRWP.  CXR:   FINDINGS:  Tortuous ectatic aorta present.  Cardiac shadow is enlarged..  The lungs are clear of active--appearing disease process.  The costophrenic angles are sharp.  There is no active disease seen.   Tele: Sinus.  No sig arrhythmia  Echo  Do not see prior echo.  Thank you for allowing [facility] to care for your patient. Please contact me with any questions!     ERNESTO Freed, 05/12/25, 8:54 AM  Cardiology

## 2025-05-12 NOTE — PROGRESS NOTES
Discharged  home via wheelchair  Accompanied by Support staff   Belongings taken by patient/family  PIV removed  Tele box removed & placed in the drawer  Discharge Navigator completed  Discharge instructions reviewed with patient a bedside  All questions & concerns addressed at his time.

## 2025-05-12 NOTE — PLAN OF CARE
Received Pt this morning, A&O X4, on RA, maintaining SR per tele. Voiced no c/o`s at present. Poc updated, cardiac work up, monitor bp. Cpm.  Problem: Patient/Family Goals  Goal: Patient/Family Long Term Goal  Description: Patient's Long Term Goal: Stay healthy  Interventions:- Resume home med regimen, follow up with providers  - See additional Care Plan goals for specific interventions  Outcome: Progressing  Goal: Patient/Family Short Term Goal  Description: Patient's Short Term Goal: Discharge from hospital  Interventions: - Trend trops, IVFs, cards to see, monitor heart rhythm- See additional Care Plan goals for specific interventions  Outcome: Progressing     Problem: CARDIOVASCULAR - ADULT  Goal: Maintains optimal cardiac output and hemodynamic stability  Description: INTERVENTIONS:- Monitor vital signs, rhythm, and trends- Monitor for bleeding, hypotension and signs of decreased cardiac output- Evaluate effectiveness of vasoactive medications to optimize hemodynamic stability- Monitor arterial and/or venous puncture sites for bleeding and/or hematoma- Assess quality of pulses, skin color and temperature- Assess for signs of decreased coronary artery perfusion - ex. Angina- Evaluate fluid balance, assess for edema, trend weights  Outcome: Progressing  Goal: Absence of cardiac arrhythmias or at baseline  Description: INTERVENTIONS:- Continuous cardiac monitoring, monitor vital signs, obtain 12 lead EKG if indicated- Evaluate effectiveness of antiarrhythmic and heart rate control medications as ordered- Initiate emergency measures for life threatening arrhythmias- Monitor electrolytes and administer replacement therapy as ordered  Outcome: Progressing     Problem: PAIN - ADULT  Goal: Verbalizes/displays adequate comfort level or patient's stated pain goal  Description: INTERVENTIONS:- Encourage pt to monitor pain and request assistance- Assess pain using appropriate pain scale- Administer analgesics based on type  and severity of pain and evaluate response- Implement non-pharmacological measures as appropriate and evaluate response- Consider cultural and social influences on pain and pain management- Manage/alleviate anxiety- Utilize distraction and/or relaxation techniques- Monitor for opioid side effects- Notify MD/LIP if interventions unsuccessful or patient reports new pain- Anticipate increased pain with activity and pre-medicate as appropriate  Outcome: Progressing

## 2025-05-12 NOTE — PLAN OF CARE
Resume care around 1900, pt A&Ox4, o2>95% on room air, pt BP elevated (prn HTN rx given), pt denies CP, dizziness, SOB or palpitation.    Problem: Patient/Family Goals  Goal: Patient/Family Long Term Goal  Description: Patient's Long Term Goal: Stay healthy  Interventions:- Resume home med regimen, follow up with providers  - See additional Care Plan goals for specific interventions  Outcome: Progressing  Goal: Patient/Family Short Term Goal  Description: Patient's Short Term Goal: Discharge from hospital  Interventions: - Trend trops, IVFs, cards to see, monitor heart rhythm- See additional Care Plan goals for specific interventions  Outcome: Progressing     Problem: CARDIOVASCULAR - ADULT  Goal: Maintains optimal cardiac output and hemodynamic stability  Description: INTERVENTIONS:- Monitor vital signs, rhythm, and trends- Monitor for bleeding, hypotension and signs of decreased cardiac output- Evaluate effectiveness of vasoactive medications to optimize hemodynamic stability- Monitor arterial and/or venous puncture sites for bleeding and/or hematoma- Assess quality of pulses, skin color and temperature- Assess for signs of decreased coronary artery perfusion - ex. Angina- Evaluate fluid balance, assess for edema, trend weights  Outcome: Progressing  Goal: Absence of cardiac arrhythmias or at baseline  Description: INTERVENTIONS:- Continuous cardiac monitoring, monitor vital signs, obtain 12 lead EKG if indicated- Evaluate effectiveness of antiarrhythmic and heart rate control medications as ordered- Initiate emergency measures for life threatening arrhythmias- Monitor electrolytes and administer replacement therapy as ordered  Outcome: Progressing     Problem: PAIN - ADULT  Goal: Verbalizes/displays adequate comfort level or patient's stated pain goal  Description: INTERVENTIONS:- Encourage pt to monitor pain and request assistance- Assess pain using appropriate pain scale- Administer analgesics based on type and  severity of pain and evaluate response- Implement non-pharmacological measures as appropriate and evaluate response- Consider cultural and social influences on pain and pain management- Manage/alleviate anxiety- Utilize distraction and/or relaxation techniques- Monitor for opioid side effects- Notify MD/LIP if interventions unsuccessful or patient reports new pain- Anticipate increased pain with activity and pre-medicate as appropriate  Outcome: Progressing

## 2025-05-12 NOTE — PROGRESS NOTES
Assumed care at 1300.   Patient is admitted for chest pain . Pt is AOX4.  VSS, afebrile and denies any pain. SpO2 maintained on RA. . Denies any SOB, cough. Tele-NSR. PO Eliquis.  Cardiac control diet. Denies any n/v/d. Voids. Up with sully . Echo -60%. Stress test-neg. Will continue to monitor. Pt is updated with plan of care.

## 2025-05-12 NOTE — PLAN OF CARE
Stress test results as reported by Radiology:     EF 78%    Perfusion: No perfusion abnormalities    Wall motion: No wall motion abnormalities     Plan to Discharge on:   Amlodipine 10 mg daily   Lisinopril 10 mg nightly (for now will up titrate at office visit if renal function stable)   Normal dose of metoprolol 50 mg BID    Office visit in 2 weeks - blood work Friday prior.

## 2025-08-11 ENCOUNTER — ANESTHESIA EVENT (OUTPATIENT)
Dept: INTERVENTIONAL RADIOLOGY/VASCULAR | Facility: HOSPITAL | Age: 85
End: 2025-08-11

## 2025-08-11 ENCOUNTER — ANESTHESIA (OUTPATIENT)
Dept: INTERVENTIONAL RADIOLOGY/VASCULAR | Facility: HOSPITAL | Age: 85
End: 2025-08-11

## 2025-08-11 ENCOUNTER — HOSPITAL ENCOUNTER (OUTPATIENT)
Dept: INTERVENTIONAL RADIOLOGY/VASCULAR | Facility: HOSPITAL | Age: 85
Discharge: HOME | End: 2025-08-11
Attending: INTERNAL MEDICINE | Admitting: INTERNAL MEDICINE

## 2025-08-11 VITALS
TEMPERATURE: 98 F | RESPIRATION RATE: 18 BRPM | BODY MASS INDEX: 36.94 KG/M2 | OXYGEN SATURATION: 95 % | HEIGHT: 64 IN | DIASTOLIC BLOOD PRESSURE: 71 MMHG | SYSTOLIC BLOOD PRESSURE: 161 MMHG | WEIGHT: 216.38 LBS | HEART RATE: 83 BPM

## 2025-08-11 DIAGNOSIS — I48.91 AFIB (HCC): ICD-10-CM

## 2025-08-11 LAB
ATRIAL RATE: 77 BPM
ISTAT ACTIVATED CLOTTING TIME: 291 SECONDS (ref 125–137)
ISTAT ACTIVATED CLOTTING TIME: 320 SECONDS (ref 125–137)
P AXIS: 63 DEGREES
P-R INTERVAL: 210 MS
Q-T INTERVAL: 434 MS
QRS DURATION: 120 MS
QTC CALCULATION (BEZET): 491 MS
R AXIS: 51 DEGREES
T AXIS: -2 DEGREES
VENTRICULAR RATE: 77 BPM

## 2025-08-11 PROCEDURE — 93655 ICAR CATH ABLTJ DSCRT ARRHYT: CPT | Performed by: INTERNAL MEDICINE

## 2025-08-11 PROCEDURE — 93657 TX L/R ATRIAL FIB ADDL: CPT | Performed by: INTERNAL MEDICINE

## 2025-08-11 PROCEDURE — 93656 COMPRE EP EVAL ABLTJ ATR FIB: CPT | Performed by: INTERNAL MEDICINE

## 2025-08-11 PROCEDURE — 93005 ELECTROCARDIOGRAM TRACING: CPT

## 2025-08-11 PROCEDURE — 36415 COLL VENOUS BLD VENIPUNCTURE: CPT

## 2025-08-11 PROCEDURE — 85347 COAGULATION TIME ACTIVATED: CPT

## 2025-08-11 PROCEDURE — 93010 ELECTROCARDIOGRAM REPORT: CPT | Performed by: INTERNAL MEDICINE

## 2025-08-11 RX ORDER — MORPHINE SULFATE 4 MG/ML
4 INJECTION, SOLUTION INTRAMUSCULAR; INTRAVENOUS EVERY 10 MIN PRN
Status: DISCONTINUED | OUTPATIENT
Start: 2025-08-11 | End: 2025-08-11 | Stop reason: HOSPADM

## 2025-08-11 RX ORDER — MORPHINE SULFATE 2 MG/ML
2 INJECTION, SOLUTION INTRAMUSCULAR; INTRAVENOUS EVERY 10 MIN PRN
Status: DISCONTINUED | OUTPATIENT
Start: 2025-08-11 | End: 2025-08-11 | Stop reason: HOSPADM

## 2025-08-11 RX ORDER — GLYCOPYRROLATE 0.2 MG/ML
INJECTION, SOLUTION INTRAMUSCULAR; INTRAVENOUS AS NEEDED
Status: DISCONTINUED | OUTPATIENT
Start: 2025-08-11 | End: 2025-08-11 | Stop reason: SURG

## 2025-08-11 RX ORDER — NITROGLYCERIN 20 MG/100ML
INJECTION INTRAVENOUS
Status: COMPLETED
Start: 2025-08-11 | End: 2025-08-11

## 2025-08-11 RX ORDER — HEPARIN SODIUM 1000 [USP'U]/ML
INJECTION, SOLUTION INTRAVENOUS; SUBCUTANEOUS AS NEEDED
Status: DISCONTINUED | OUTPATIENT
Start: 2025-08-11 | End: 2025-08-11 | Stop reason: SURG

## 2025-08-11 RX ORDER — LIDOCAINE HYDROCHLORIDE 20 MG/ML
INJECTION, SOLUTION INFILTRATION; PERINEURAL
Status: COMPLETED
Start: 2025-08-11 | End: 2025-08-11

## 2025-08-11 RX ORDER — NALOXONE HYDROCHLORIDE 0.4 MG/ML
0.08 INJECTION, SOLUTION INTRAMUSCULAR; INTRAVENOUS; SUBCUTANEOUS AS NEEDED
Status: DISCONTINUED | OUTPATIENT
Start: 2025-08-11 | End: 2025-08-11 | Stop reason: HOSPADM

## 2025-08-11 RX ORDER — SODIUM CHLORIDE, SODIUM LACTATE, POTASSIUM CHLORIDE, CALCIUM CHLORIDE 600; 310; 30; 20 MG/100ML; MG/100ML; MG/100ML; MG/100ML
INJECTION, SOLUTION INTRAVENOUS CONTINUOUS
Status: DISCONTINUED | OUTPATIENT
Start: 2025-08-11 | End: 2025-08-11 | Stop reason: HOSPADM

## 2025-08-11 RX ORDER — LIDOCAINE HYDROCHLORIDE 10 MG/ML
INJECTION, SOLUTION EPIDURAL; INFILTRATION; INTRACAUDAL; PERINEURAL AS NEEDED
Status: DISCONTINUED | OUTPATIENT
Start: 2025-08-11 | End: 2025-08-11 | Stop reason: SURG

## 2025-08-11 RX ORDER — PROTAMINE SULFATE 10 MG/ML
INJECTION, SOLUTION INTRAVENOUS
Status: COMPLETED
Start: 2025-08-11 | End: 2025-08-11

## 2025-08-11 RX ORDER — PROTAMINE SULFATE 10 MG/ML
INJECTION, SOLUTION INTRAVENOUS AS NEEDED
Status: DISCONTINUED | OUTPATIENT
Start: 2025-08-11 | End: 2025-08-11 | Stop reason: SURG

## 2025-08-11 RX ORDER — ONDANSETRON 2 MG/ML
INJECTION INTRAMUSCULAR; INTRAVENOUS AS NEEDED
Status: DISCONTINUED | OUTPATIENT
Start: 2025-08-11 | End: 2025-08-11 | Stop reason: SURG

## 2025-08-11 RX ORDER — HEPARIN SODIUM 1000 [USP'U]/ML
INJECTION, SOLUTION INTRAVENOUS; SUBCUTANEOUS
Status: COMPLETED
Start: 2025-08-11 | End: 2025-08-11

## 2025-08-11 RX ORDER — HYDROMORPHONE HYDROCHLORIDE 1 MG/ML
0.4 INJECTION, SOLUTION INTRAMUSCULAR; INTRAVENOUS; SUBCUTANEOUS EVERY 5 MIN PRN
Status: DISCONTINUED | OUTPATIENT
Start: 2025-08-11 | End: 2025-08-11 | Stop reason: HOSPADM

## 2025-08-11 RX ORDER — MORPHINE SULFATE 10 MG/ML
6 INJECTION, SOLUTION INTRAMUSCULAR; INTRAVENOUS EVERY 10 MIN PRN
Status: DISCONTINUED | OUTPATIENT
Start: 2025-08-11 | End: 2025-08-11 | Stop reason: HOSPADM

## 2025-08-11 RX ORDER — SODIUM CHLORIDE 9 MG/ML
INJECTION, SOLUTION INTRAVENOUS CONTINUOUS
Status: DISCONTINUED | OUTPATIENT
Start: 2025-08-11 | End: 2025-08-11

## 2025-08-11 RX ORDER — DEXAMETHASONE SODIUM PHOSPHATE 4 MG/ML
VIAL (ML) INJECTION AS NEEDED
Status: DISCONTINUED | OUTPATIENT
Start: 2025-08-11 | End: 2025-08-11 | Stop reason: SURG

## 2025-08-11 RX ORDER — HYDROMORPHONE HYDROCHLORIDE 1 MG/ML
0.6 INJECTION, SOLUTION INTRAMUSCULAR; INTRAVENOUS; SUBCUTANEOUS EVERY 5 MIN PRN
Status: DISCONTINUED | OUTPATIENT
Start: 2025-08-11 | End: 2025-08-11 | Stop reason: HOSPADM

## 2025-08-11 RX ORDER — ROCURONIUM BROMIDE 10 MG/ML
INJECTION, SOLUTION INTRAVENOUS AS NEEDED
Status: DISCONTINUED | OUTPATIENT
Start: 2025-08-11 | End: 2025-08-11 | Stop reason: SURG

## 2025-08-11 RX ORDER — HYDROMORPHONE HYDROCHLORIDE 1 MG/ML
0.2 INJECTION, SOLUTION INTRAMUSCULAR; INTRAVENOUS; SUBCUTANEOUS EVERY 5 MIN PRN
Status: DISCONTINUED | OUTPATIENT
Start: 2025-08-11 | End: 2025-08-11 | Stop reason: HOSPADM

## 2025-08-11 RX ADMIN — ROCURONIUM BROMIDE 50 MG: 10 INJECTION, SOLUTION INTRAVENOUS at 10:36:00

## 2025-08-11 RX ADMIN — LIDOCAINE HYDROCHLORIDE 25 MG: 10 INJECTION, SOLUTION EPIDURAL; INFILTRATION; INTRACAUDAL; PERINEURAL at 10:36:00

## 2025-08-11 RX ADMIN — PROTAMINE SULFATE 50 MG: 10 INJECTION, SOLUTION INTRAVENOUS at 11:38:00

## 2025-08-11 RX ADMIN — HEPARIN SODIUM 10000 UNITS: 1000 INJECTION, SOLUTION INTRAVENOUS; SUBCUTANEOUS at 10:53:00

## 2025-08-11 RX ADMIN — HEPARIN SODIUM 2000 UNITS: 1000 INJECTION, SOLUTION INTRAVENOUS; SUBCUTANEOUS at 11:09:00

## 2025-08-11 RX ADMIN — GLYCOPYRROLATE 0.2 MG: 0.2 INJECTION, SOLUTION INTRAMUSCULAR; INTRAVENOUS at 10:42:00

## 2025-08-11 RX ADMIN — SODIUM CHLORIDE: 9 INJECTION, SOLUTION INTRAVENOUS at 09:00:00

## 2025-08-11 RX ADMIN — DEXAMETHASONE SODIUM PHOSPHATE 4 MG: 4 MG/ML VIAL (ML) INJECTION at 10:42:00

## 2025-08-11 RX ADMIN — ONDANSETRON 4 MG: 2 INJECTION INTRAMUSCULAR; INTRAVENOUS at 10:42:00

## (undated) DEVICE — BREAST-HERNIA-PORT CDS-LF: Brand: MEDLINE INDUSTRIES, INC.

## (undated) DEVICE — BRA SURGICAL ELIZABETH PINK XL

## (undated) DEVICE — KENDALL SCD EXPRESS SLEEVES, KNEE LENGTH, MEDIUM: Brand: KENDALL SCD

## (undated) DEVICE — STERILE POLYISOPRENE POWDER-FREE SURGICAL GLOVES: Brand: PROTEXIS

## (undated) DEVICE — 5.0MM PRECISION ROUND

## (undated) DEVICE — GLOVE SUR 7.5 SENSICARE PIP WHT PWD F

## (undated) DEVICE — GLOVE SUR 7 SENSICARE PI PIP CRM PWD F

## (undated) DEVICE — DECANTER BAG 9": Brand: MEDLINE INDUSTRIES, INC.

## (undated) DEVICE — SUT VCRL 0 18IN CT-1 ABSRB VLT CR L36MM 1/2

## (undated) DEVICE — MARKER SKIN PREP-RESISTANT ST: Brand: MEDLINE INDUSTRIES, INC.

## (undated) DEVICE — SUT NRLN 4-0 18IN N ABSRB BLK CR L18MM TF

## (undated) DEVICE — Device: Brand: STABLECUT®

## (undated) DEVICE — KIT HEMSTAT MTRX 8ML PORCINE GEL HUM THROM

## (undated) DEVICE — ADHESIVE SKIN TOP FOR WND CLSR DERMBND ADV

## (undated) DEVICE — E-Z CLEAN, NON-STICK, PTFE COATED, ELECTROSURGICAL BLADE ELECTRODE, MODIFIED EXTENDED INSULATION, 4 INCH (10.2 CM): Brand: MEGADYNE

## (undated) DEVICE — SUT SLK 2-0 18IN FS BK

## (undated) DEVICE — SUT VCRL + 1 27IN ABSRB UD OS-6 L36MM

## (undated) DEVICE — WRAP COOLING KNEE W/ICE PILLOW

## (undated) DEVICE — STOCK ORTH TUB 72X8IN NLTX

## (undated) DEVICE — SUT ETHLN 3-0 18IN PS-2 NABSRB BLK 19MM 3/8 C

## (undated) DEVICE — 3M™ TEGADERM™ TRANSPARENT FILM DRESSING FRAME STYLE, 1628, 6 IN X 8 IN (15 CM X 20 CM), 10/CT 8CT/CASE: Brand: 3M™ TEGADERM™

## (undated) DEVICE — 450 ML BOTTLE OF 0.05% CHLORHEXIDINE GLUCONATE IN 99.95% STERILE WATER FOR IRRIGATION, USP AND APPLICATOR.: Brand: IRRISEPT ANTIMICROBIAL WOUND LAVAGE

## (undated) DEVICE — LAMINECTOMY CDS: Brand: MEDLINE INDUSTRIES, INC.

## (undated) DEVICE — 3.0MM PRECISION ROUND

## (undated) DEVICE — PADDING CAST COTTON  4

## (undated) DEVICE — DRAPE,TOWEL,LARGE,INVISISHIELD: Brand: MEDLINE

## (undated) DEVICE — SOLUTION IRRIG 1000ML 0.9% NACL USP BTL

## (undated) DEVICE — SLEEVE COMPR MD KNEE LEN SGL USE KENDALL SCD

## (undated) DEVICE — BANDAGE ROLL,100% COTTON, 6 PLY, LARGE: Brand: KERLIX

## (undated) DEVICE — DRESSING AQUACEL AG 3.5X12

## (undated) DEVICE — SUTURE VCRL SZ 3-0 L27IN ABSRB UD L26MM SH

## (undated) DEVICE — 3M™ TEGADERM™ TRANSPARENT FILM DRESSING FRAME STYLE, 1626W, 4 IN X 4-3/4 IN (10 CM X 12 CM), 50/CT 4CT/CASE: Brand: 3M™ TEGADERM™

## (undated) DEVICE — PSI PSN PREF CR PIN GUIDE: Type: IMPLANTABLE DEVICE

## (undated) DEVICE — ZIMMER® STERILE DISPOSABLE TOURNIQUET CUFF WITH PLC, DUAL PORT, SINGLE BLADDER, 34 IN. (86 CM)

## (undated) DEVICE — Device

## (undated) DEVICE — BOWL CEMENT MIX QUICK-VAC

## (undated) DEVICE — UNDYED BRAIDED (POLYGLACTIN 910), SYNTHETIC ABSORBABLE SUTURE: Brand: COATED VICRYL

## (undated) DEVICE — SUT COAT VCRL+ 0 27IN UR-6 ABSRB VLT ANTIBACT

## (undated) DEVICE — ELECTRODE ES L10.2CM BLDE L4IN EXT MPLR OPN

## (undated) DEVICE — SOL  .9 1000ML BTL

## (undated) DEVICE — SPECIMEN CONTAINER,POSITIVE SEAL INDICATOR, OR PACKAGED: Brand: PRECISION

## (undated) DEVICE — SLEEVE COMPR M KNEE LEN SGL USE KENDALL SCD

## (undated) DEVICE — UNIVERSAL STERIBUMP® STERILE (5/CASE): Brand: UNIVERSAL STERIBUMP®

## (undated) DEVICE — Device: Brand: INTELLICART™

## (undated) DEVICE — COVER LT HNDL RIG FOR SUR CAM DISP

## (undated) DEVICE — TOTAL KNEE CDS: Brand: MEDLINE INDUSTRIES, INC.

## (undated) DEVICE — SUTURE VICRYL 2-0 FSL

## (undated) DEVICE — APPLICATOR SKIN PREP 26ML HI LT ORNG 2% CHG

## (undated) DEVICE — GLOVE SURG SENSICARE SZ 7

## (undated) DEVICE — GLOVE SUR 7.5 SENSICARE PI PIP CRM PWD F

## (undated) DEVICE — SUT VCRL 2-0 18IN ABSRB UD CR L26MM CT-2

## (undated) DEVICE — SUPER SPONGES,MEDIUM: Brand: KERLIX

## (undated) DEVICE — GOWN,SIRUS,FABRIC-REINFORCED,X-LARGE: Brand: MEDLINE

## (undated) DEVICE — MLPD DISPOSABLE PAD (6' ROLL) 3 ROLLS: Brand: SCHAERER MEDICAL USA

## (undated) DEVICE — SOL LACT RINGERS 3000ML

## (undated) DEVICE — TUBING CYSTO TUR DUAL

## (undated) DEVICE — [AGGRESSIVE PLUS CUTTER, ARTHROSCOPIC SHAVER BLADE,  DO NOT RESTERILIZE,  DO NOT USE IF PACKAGE IS DAMAGED,  KEEP DRY,  KEEP AWAY FROM SUNLIGHT]: Brand: FORMULA

## (undated) DEVICE — PREMIUM WET SKIN PREP TRAY: Brand: MEDLINE INDUSTRIES, INC.

## (undated) DEVICE — KNEE ARTHROSCOPY CDS: Brand: MEDLINE INDUSTRIES, INC.

## (undated) DEVICE — SUT COAT VCRL + 2-0 27IN ABSRB UD ANTIBACT PSL

## (undated) DEVICE — LIGHT HANDLE

## (undated) DEVICE — ABDOMINAL PAD: Brand: DERMACEA

## (undated) DEVICE — WRAP THERAPEUTIC BACK WO GEL P

## (undated) DEVICE — ANTIBACTERIAL UNDYED BRAIDED (POLYGLACTIN 910), SYNTHETIC ABSORBABLE SUTURE: Brand: COATED VICRYL

## (undated) DEVICE — 2T11 #2 PDO 36 X 36: Brand: 2T11 #2 PDO 36 X 36

## (undated) NOTE — LETTER
OUTSIDE TESTING RESULT REQUEST     IMPORTANT: FOR YOUR IMMEDIATE ATTENTION  Please FAX all test results listed below to: [phone]     Testing already done on or about: 10/30/23     * * * * If testing is NOT complete, arrange with patient A.S.A.P. * * * *      Patient Name: Cameron Redman  Surgery Date: 10/30/2023  Medical Record: [id number]  CSN: [id number]  : 1940 - A: 82 y     Sex: female  Surgeon(s):  Jesus Manuel Arzate MD  Procedure: LEFT BREAST NEEDLE LOCALIZATION LUMPECTOMY  Anesthesia Type: General     Surgeon: Jesus Manuel Arzate MD     The following Testing and Time Line are REQUIRED PER ANESTHESIA     EKG READ AND SIGNED WITHIN   90 days      Thank Lupillo Lanier,   Sent by: Kenisha Barreto

## (undated) NOTE — LETTER
MEDICATION CONTRAINDICATION     Patient Name: Maritza Cantu  -Age: 1940-A: 84 y Sex:  female   MRN: [id number] [facility]: [id number]        Procedure: LUMBAR 2 -LUMBAR 3, LUMBAR 3 - LUMBAR 4, LUMBAR 4 - LUMBAR 5 DECOMPRESSION, LUMBAR 2 - LUMBAR 3 DISCECTOMY, LUMBAR 2 - LUMBAR 3, LUMBAR 3 - LUMBAR 4, LUMBAR 5 - LUMBAR 5 UNINSTRUMENTED FUSION   Surgery Date:  2024  Anesthesia Type: General  Surgeon(s):  JEWELS Moreno MD     The above patient has a contraindication to the medication ordered from your standing orders/Ej العراقي Pre-Op Standing orders listed below.    Medication: Celebrex  Allergies: Oxaprozin  Contraindication: To Celebrex due to patient's allergy to oxaprozin; reaction noted is nausea  If you would like the medication given, please fax this form back indicating the override reason with physician signature/date/time.              ___  Benefit outweighs risk   ___  Insignificant               ___ Low risk                 ___ Not a true allergy              ___ Dose appropriate                ___  Tolerated regimen in the past     Physician Signature: ________________________ Date/Time: ______________  PLEASE FAX FORM BACK TO:  [phone]

## (undated) NOTE — ED AVS SNAPSHOT
Amos Payor   MRN: [id number]    Department:  [facility] Emergency Department   Date of Visit:  9/29/2019           Disclosure     Insurance plans vary and the physician(s) referred by the ER may not be covered by your plan.  Please contact yo tell this physician (or your personal doctor if your instructions are to return to your personal doctor) about any new or lasting problems. The primary care or specialist physician will see patients referred from the [facility] Emergency Department.  Brigitte Moncada

## (undated) NOTE — ED AVS SNAPSHOT
Jarrett Topete   MRN: [id number]    Department:  [facility] Emergency Department   Date of Visit:  5/23/2018           Disclosure     Insurance plans vary and the physician(s) referred by the ER may not be covered by your plan.  Please contact yo tell this physician (or your personal doctor if your instructions are to return to your personal doctor) about any new or lasting problems. The primary care or specialist physician will see patients referred from the [facility] Emergency Department.  Armando Ortiz

## (undated) NOTE — IP AVS SNAPSHOT
Patient Demographics     Address  [facility]  [address] [facility] [address] Phone  [phone] [facility]) *Preferred*  [phone] [facility]) E-mail Address  [email or web address]      Emergency Contact(s)     Name Relation Home Work Mobile    claude figueroa o Gauze dressings are NOT waterproof and REQUIRE being covered with a waterproof barrier to keep the dressing and the incision dry. o SARAN WRAP, GLAD WRAP,  and PRESS N SEAL WORK  REALLY WELL BUT ANY PLASTIC WRAP WILL DO.   o Do not wash incision.    o Re normal up to 2 weeks after surgery but it should be less every day until it stops. ? Call physician if you notice any concerning changes. ? Sutures/staples will be removed at first office visit (10 days- 3 weeks). times a day, especially after therapy. Be sure there is a thin cloth barrier between skin and ice or cold therapy. ?  Change position at least every 45 minutes while awake to avoid stiffness or increased discomfort.  o For knee replacements– may elevate yo ? Additional visits may need to be scheduled. Your physician will discuss this at first post-op office visit. ? Schedule outpatient physical therapy per your surgeon’s orders.   ? Schedule one week follow up after surgery WITH PRIMARY CARE PHYSICIAN; ghislaine this as they are easily copied.  ALSO request a wheelchair the first year to board and get off a plane…this aids in priority seating and you should sit on the aisle or at the bulkhead where you can easily stretch your legs and get up to walk up and down the Do not drive when taking pain medications. Do not take pain medication when sedated. do not drink alcohol when taking pain medication. Do not combine pain medications. Do not take other drugs when taking pain medication.  Seek medical attention if any quest Take 1 tablet (20 mg total) by mouth daily. Cecelia Curtis MD         Lisinopril-hydroCHLOROthiazide 20-12.5 MG Tabs      Take 1 tablet by mouth daily.    Cecelia Curtis MD         Metoprolol Tartrate 50 MG Tabs  Commonly known as:  Mine Pun [id number] ferrous sulfate EC tab 325 mg 02/26/20 0817 Given      [id number] lisinopril tab 20 mg 02/25/20 1659 Given      [id number] traMADol HCl (ULTRAM) tab 50 mg 02/25/20 2125 Given      [id number] traMADol HCl (ULTRAM) tab 50 mg 02/26/20 0403 Given Type Source Collected On   Blood — 02/26/20 1208          Components    Component Value Reference Range Flag Lab   WBC 10.8 4.0 - 11.0 x10(3) uL — [facility]   RBC 4.33 3.80 - 5.30 x10(6)uL — [facility]   HGB 12.5 12.0 - 16.0 g/dL — [facility]   HCT 38.1 3 before breakfast. One daily times 6 days and 2 pills one day per week. ) 100 tablet 2   • Lisinopril-hydroCHLOROthiazide 20-12.5 MG Oral Tab Take 1 tablet by mouth daily. 90 tablet 3   • lisinopril 20 MG Oral Tab Take 1 tablet (20 mg total) by mouth daily. Family History:[MA.1]  Family History   Problem Relation Age of Onset   • Breast Cancer Sister 55        dx 55 passed 46   • Other (Other) Sister         hypothyroid   • Lipids Father    • Hypertension Father    • Heart Disorder Father    • Other (Other) D encounter diagnosis)[MA.2]  Schedule for elective left total knee replacement. Limitations, risk, complications are discussed. Possibility of continued pain, stiffness, neurovascular compromise, incomplete resolution of symptoms are all dialogued.   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 , Disp: , Rfl:   Multiple Vitamin (TAB-A-AGUEDA) Oral Tab, Take 1 tablet by mouth daily. , Disp: , Rfl:   Levothyroxine Sodium 100 MCG Oral Tab, Take by mouth See Ad Electronically signed by Tamica Rodriguez MD on 2/25/2020  9:56 PM   Attribution Padilla    ID. 1 - Tamica Rodriguez MD on 2/25/2020  2:05 PM  ID. 2 - Tamica Rodriguez MD on 2/25/2020  2:06 PM  ID. 3 - Tamica Rodriguez MD on 2/25/2020  9:46 PM  ID. 4 - Tamica Rodriguez MD o knee   • BENIGN BIOPSY RIGHT  1995   • COLONOSCOPY  9/2003    normal   • COLONOSCOPY  10/6/11 - [facility]    adenoma, hemorrhoids. repeat 2021.    • COLONOSCOPY,DIAGNOSTIC  11/17/16    normal   • ESOPHAGOGASTRODUODENOSCOPY, POSSIBLE BIOPSY, POSSIBLE POLYPECTO -   Sitting down on and standing up from a chair with arms (e.g., wheelchair, bedside commode, etc.): A Little   -   Moving from lying on back to sitting on the side of the bed?: A Little   How much help from another person does the patient currently need. Pt left in rehab gym c [facility] staff for d/c education.[CY.2]         Exercises AM Session PM Session   Ankle Pumps[CY.1] 10[CY. 2] reps[CY.1] 15[CY. 2] reps   Quad Sets[CY.1] 10[CY. 2] reps[CY.1] 15[CY. 2] reps   Glut Sets[CY.1] 10[CY. 2] reps[CY.1] 15[CY. 2] reps training;Balance training  Rehab Potential : Good  Frequency (Obs): BID    CURRENT GOALS   Goal #1     Patient is able to demonstrate supine - sit EOB @ level: supervision -- Met 02/25/2020    Goal #2     Patient is able to demonstrate transfers Sit to/fro • Degenerative lumbar spinal stenosis 11/19/2009   • Diaphragmatic hernia without mention of obstruction or gangrene    • Disorder of thyroid    • Esophageal reflux 2/26/2008   • Hearing impairment     Roman HA's   • High blood pressure    • Hypothyroidism Lives With: Alone(Daughter Cassandra Sosa will be staying to assist as long as needed)  Drives: Yes  Patient Owned Equipment: Rolling walker(quad cane)  Patient Regularly Uses: Glasses[NP.2]    Prior Level of [place]: Patient was independent with ADLs & self c blankets)?: None   -   Sitting down on and standing up from a chair with arms (e.g., wheelchair, bedside commode, etc.): A Little   -   Moving from lying on back to sitting on the side of the bed?: None[NP.2]   How much help from another person does the pa addressed;SCDs in place; Ice applied; Family present; Discussed recommendations with /[NP.2]    ASSESSMENT   Patient is a[NP.3 68year old[NP.2] female admitted on 2/25/2020 for[NP.1] S/p Left TKA on 02/25/2020[NP.2].   Pertinent kevin Patient is able to demonstrate transfers Sit to/from Stand at assistance level: supervison    Goal #3    Patient is able to ambulate 150 feet with assistive device at assistance level: modified  independent    Goal #4    Patient will negotiate 4 stairs/o Shower/Tub and Equipment: Walk-in shower; Shower chair  Other Equipment: None    Occupation/Status: retired     Drives: Yes  Patient Regularly Uses: Glasses    Prior Level of Function:[BW.1] Patient reports independent in all I/ADL and functional mobility w Skilled Therapy Provided:[BW.1] Activities performed this session include: Education on knee/surgical precautions and incorporation into ADLs; patient required minimal cueing to follow throughout session; education on bed mobility, performed Mod I; educati modifications of tasks[BW.2]    Clinical Decision Making[BW.1]  LOW - Analysis of occupational profile, problem-focused assessments, limited treatment options[BW.2]    Overall Complexity[BW.1]  LOW[BW.2]     OT Discharge Recommendations: Home(with family a INFLUENZA 09/29/16     INFLUENZA 10/05/15     INFLUENZA 10/07/14     INFLUENZA 10/16/13     INFLUENZA 10/09/08     INFLUENZA 10/11/07     Kenalog Per 10mg Inj 12/02/11     Methylprednisolone 40 Mg Inj 12/11/13     Orthovisc, Intra-Articular 07/17/19     O Interventions:   - PT/OT, walker  - Pain meds prn]  - See additional Care Plan goals for specific interventions

## (undated) NOTE — LETTER
[address]  [address]  Authorization for Surgical Operation and Procedure     Date:___________                                                                                                         Time:__________  I hereby authorize Surgeon(s):  JEWELS Moreno MD, my physician and his/her assistants (if applicable), which may include medical students, residents, and/or fellows, to perform the following surgical operation/ procedure and administer such anesthesia as may be determined necessary by my physician:  Operation/Procedure name (s) Procedure(s):  LUMBAR 2 -LUMBAR 3, LUMBAR 3 - LUMBAR 4, LUMBAR 4 - LUMBAR 5 DECOMPRESSION, LUMBAR 2 - LUMBAR 3 DISCECTOMY, LUMBAR 2 - LUMBAR 3, LUMBAR 3 - LUMBAR 4, LUMBAR 4 - LUMBAR 5 UNINSTRUMENTED FUSION on Maritza MARQUIS Perkinsutler   2.   I recognize that during the surgical operation/procedure, unforeseen conditions may necessitate additional or different procedures than those listed above.  I, therefore, further authorize and request that the above-named surgeon, assistants, or designees perform such procedures as are, in their judgment, necessary and desirable.    3.   My surgeon/physician has discussed prior to my surgery the potential benefits, risks and side effects of this procedure; the likelihood of achieving goals; and potential problems that might occur during recuperation.  They also discussed reasonable alternatives to the procedure, including risks, benefits, and side effects related to the alternatives and risks related to not receiving this procedure.  I have had all my questions answered and I acknowledge that no guarantee has been made as to the result that may be obtained.    4.   Should the need arise during my operation/procedure, which includes change of level of care prior to discharge, I also consent to the administration of blood and/or blood products.  Further, I understand that despite careful testing and screening of  blood or blood products by collecting agencies, I may still be subject to ill effects as a result of receiving a blood transfusion and/or blood products.  The following are some, but not all, of the potential risks that can occur: fever and allergic reactions, hemolytic reactions, transmission of diseases such as Hepatitis, AIDS and Cytomegalovirus (CMV) and fluid overload.  In the event that I wish to have an autologous transfusion of my own blood, or a directed donor transfusion, I will discuss this with my physician.  Check only if Refusing Blood or Blood Products  I understand refusal of blood or blood products as deemed necessary by my physician may have serious consequences to my condition to include possible death. I hereby assume responsibility for my refusal and release the hospital, its personnel, and my physicians from any responsibility for the consequences of my refusal.          o  Refuse      5.   I authorize the use of any specimen, organs, tissues, body parts or foreign objects that may be removed from my body during the operation/procedure for diagnosis, research or teaching purposes and their subsequent disposal by hospital authorities.  I also authorize the release of specimen test results and/or written reports to my treating physician on the hospital medical staff or other referring or consulting physicians involved in my care, at the discretion of the Pathologist or my treating physician.    6.   I consent to the photographing or videotaping of the operations or procedures to be performed, including appropriate portions of my body for medical, scientific, or educational purposes, provided my identity is not revealed by the pictures or by descriptive texts accompanying them.  If the procedure has been photographed/videotaped, the surgeon will obtain the original picture, image, videotape or CD.  The hospital will not be responsible for storage, release or maintenance of the picture, image, tape  or CD.    7.   I consent to the presence of a  or observers in the operating room as deemed necessary by my physician or their designees.    8.   I recognize that in the event my procedure results in extended X-Ray/fluoroscopy time, I may develop a skin reaction.    9. If I have a Do Not Attempt Resuscitation (DNAR) order in place, that status will be suspended while in the operating room, procedural suite, and during the recovery period unless otherwise explicitly stated by me (or a person authorized to consent on my behalf). The surgeon or my attending physician will determine when the applicable recovery period ends for purposes of reinstating the DNAR order.  10. Patients having a sterilization procedure: I understand that if the procedure is successful the results will be permanent and it will therefore be impossible for me to inseminate, conceive, or bear children.  I also understand that the procedure is intended to result in sterility, although the result has not been guaranteed.   11. I acknowledge that my physician has explained sedation/analgesia administration to me including the risk and benefits I consent to the administration of sedation/analgesia as may be necessary or desirable in the judgment of my physician.    I CERTIFY THAT I HAVE READ AND FULLY UNDERSTAND THE ABOVE CONSENT TO OPERATION and/or OTHER PROCEDURE.    _________________________________________  __________________________________  Signature of Patient     Signature of Responsible Person         ___________________________________         Printed Name of Responsible Person           _________________________________                 Relationship to Patient  _________________________________________  ______________________________  Signature of Witness          Date  Time      Patient Name: Maritza Cantu     : 1940                 Printed: 2024     Medical Record #: [id number]                     Page  2 of 3                                    [address]  [address]    Consent for Anesthesia    I, Maritza Cantu agree to be cared for by an anesthesiologist, who is specially trained to monitor me and give me medicine to put me to sleep or keep me comfortable during my procedure    I understand that my anesthesiologist is not an employee or agent of [facility] or [employer]. He or she works for [employer].    As the patient asking for anesthesia services, I agree to:  Allow the anesthesiologist (anesthesia doctor) to give me medicine and do additional procedures as necessary. Some examples are: Starting or using an “IV” to give me medicine, fluids or blood during my procedure, and having a breathing tube placed to help me breathe when I’m asleep (intubation). In the event that my heart stops working properly, I understand that my anesthesiologist will make every effort to sustain my life, unless otherwise directed by [facility] Do Not Resuscitate documents.  Tell my anesthesia doctor before my procedure:  If I am pregnant.  The last time that I ate or drank.  All of the medicines I take (including prescriptions, herbal supplements, and pills I can buy without a prescription (including street drugs/illegal medications). Failure to inform my anesthesiologist about these medicines may increase my risk of anesthetic complications.  If I am allergic to anything or have had a reaction to anesthesia before.  I understand how the anesthesia medicine will help me (benefits).  I understand that with any type of anesthesia medicine there are risks:  The most common risks are: nausea, vomiting, sore throat, muscle soreness, damage to my eyes, mouth, or teeth (from breathing tube placement).  Rare risks include: remembering what happened during my procedure, allergic reactions to medications, injury to my airway, heart, lungs, vision, nerves, or  muscles and in extremely rare instances death.  My doctor has explained to me other choices available to me for my care (alternatives).  Pregnant Patients (“epidural”):  I understand that the risks of having an epidural (medicine given into my back to help control pain during labor), include itching, low blood pressure, difficulty urinating, headache or slowing of the baby’s heart. Very rare risks include infection, bleeding, seizure, irregular heart rhythms and nerve injury.  Regional Anesthesia (“spinal”, “epidural”, & “nerve blocks”):  I understand that rare but potential complications include headache, bleeding, infection, seizure, irregular heart rhythms, and nerve injury.    I can change my mind about having anesthesia services at any time before I get the medicine.    _____________________________________________________________________________  Patient (or Representative) Signature/Relationship to Patient  Date   Time    _____________________________________________________________________________   Name (if used)    Language/Organization   Time    _____________________________________________________________________________  Anesthesiologist Signature     Date   Time  I have discussed the procedure and information above with the patient (or patient’s representative) and answered their questions. The patient or their representative has agreed to have anesthesia services.    _____________________________________________________________________________  Witness        Date   Time  I have verified that the signature is that of the patient or patient’s representative, and that it was signed before the procedure  Patient Name: Maritza Cantu     : 1940                 Printed: 2024     Medical Record #: [id number]                     Page 3 of 3

## (undated) NOTE — ED AVS SNAPSHOT
Abhishek Triny   MRN: [id number]    Department:  [facility] Emergency Department   Date of Visit:  11/1/2017           Disclosure     Insurance plans vary and the physician(s) referred by the ER may not be covered by your plan.  Please contact yo If you have been prescribed any medication(s), please fill your prescription right away and begin taking the medication(s) as directed    If the emergency physician has read X-rays, these will be re-interpreted by a radiologist.  If there is a significant

## (undated) NOTE — LETTER
OUTSIDE TESTING RESULT REQUEST     IMPORTANT: FOR YOUR IMMEDIATE ATTENTION  Please FAX all test results listed below to: [phone]     Testing already done on or about: 7/10/2024     * * * * If testing is NOT complete, arrange with patient A.S.A.P. * * * *      Patient Name: Maritza Cantu  Surgery Date: 2024  Medical Record: [id number]  CSN: [id number]  : 1940 - A: 84 y     Sex: female  Surgeon(s):  JEWELS Moreno MD  Procedure: LUMBAR 2 -LUMBAR 3, LUMBAR 3 - LUMBAR 4, LUMBAR 4 - LUMBAR 5 DECOMPRESSION, LUMBAR 2 - LUMBAR 3 DISCECTOMY, LUMBAR 2 - LUMBAR 3, LUMBAR 3 - LUMBAR 4, LUMBAR 5 - LUMBAR 5 UNINSTRUMENTED FUSION  Anesthesia Type: General     Surgeon: JEWELS Moreno MD     The following Testing and Time Line are REQUIRED PER ANESTHESIA     EKG READ AND SIGNED WITHIN   90 days  Chest X-Ray within 6 months  CBC [with Differential & Platelets] within  90 days  CMP (requires 4 hour fast) within  90 days  PT/INR within  30 days  PTT within  30 days  UA with Reflex to Culture within  30 days  MSSA/MRSA Nasal screening within 30 days      Thank You,   Sent by: HERMAN Urean

## (undated) NOTE — ED AVS SNAPSHOT
Josh Olivaresjoni   MRN: [id number]    Department:  [address] [facility] in [place]   Date of Visit:  2/19/2019           Disclosure     Insurance plans vary and the physician(s) referred by the ER may not be covered by your plan.  Please conta tell this physician (or your personal doctor if your instructions are to return to your personal doctor) about any new or lasting problems. The primary care or specialist physician will see patients referred from the [facility] Emergency Department.  Edvin Carty